# Patient Record
Sex: FEMALE | Race: WHITE | Employment: PART TIME | URBAN - METROPOLITAN AREA
[De-identification: names, ages, dates, MRNs, and addresses within clinical notes are randomized per-mention and may not be internally consistent; named-entity substitution may affect disease eponyms.]

---

## 2018-09-28 ENCOUNTER — APPOINTMENT (OUTPATIENT)
Dept: ULTRASOUND IMAGING | Age: 23
End: 2018-09-28
Payer: MEDICAID

## 2018-09-28 ENCOUNTER — HOSPITAL ENCOUNTER (EMERGENCY)
Age: 23
Discharge: LEFT BEFORE DISCHARGE | End: 2018-09-28
Payer: MEDICAID

## 2018-09-28 VITALS
SYSTOLIC BLOOD PRESSURE: 111 MMHG | DIASTOLIC BLOOD PRESSURE: 66 MMHG | OXYGEN SATURATION: 100 % | WEIGHT: 100 LBS | HEART RATE: 82 BPM | HEIGHT: 61 IN | TEMPERATURE: 98.1 F | BODY MASS INDEX: 18.88 KG/M2 | RESPIRATION RATE: 18 BRPM

## 2018-09-28 DIAGNOSIS — Z53.20 PATIENT LEFT BEFORE TREATMENT COMPLETED: ICD-10-CM

## 2018-09-28 DIAGNOSIS — R10.10 UPPER ABDOMINAL PAIN: Primary | ICD-10-CM

## 2018-09-28 LAB
A/G RATIO: 1.6 (ref 1.1–2.2)
ALBUMIN SERPL-MCNC: 4.6 G/DL (ref 3.4–5)
ALP BLD-CCNC: 101 U/L (ref 40–129)
ALT SERPL-CCNC: 25 U/L (ref 10–40)
ANION GAP SERPL CALCULATED.3IONS-SCNC: 13 MMOL/L (ref 3–16)
AST SERPL-CCNC: 17 U/L (ref 15–37)
BASOPHILS ABSOLUTE: 0.1 K/UL (ref 0–0.2)
BASOPHILS RELATIVE PERCENT: 1.1 %
BILIRUB SERPL-MCNC: 0.4 MG/DL (ref 0–1)
BILIRUBIN URINE: NEGATIVE
BLOOD, URINE: NEGATIVE
BUN BLDV-MCNC: 14 MG/DL (ref 7–20)
CALCIUM SERPL-MCNC: 9.7 MG/DL (ref 8.3–10.6)
CHLORIDE BLD-SCNC: 102 MMOL/L (ref 99–110)
CLARITY: CLEAR
CO2: 28 MMOL/L (ref 21–32)
COLOR: YELLOW
CREAT SERPL-MCNC: <0.5 MG/DL (ref 0.6–1.1)
EOSINOPHILS ABSOLUTE: 0.1 K/UL (ref 0–0.6)
EOSINOPHILS RELATIVE PERCENT: 1.3 %
GFR AFRICAN AMERICAN: >60
GFR NON-AFRICAN AMERICAN: >60
GLOBULIN: 2.8 G/DL
GLUCOSE BLD-MCNC: 102 MG/DL (ref 70–99)
GLUCOSE URINE: NEGATIVE MG/DL
HCG(URINE) PREGNANCY TEST: NEGATIVE
HCT VFR BLD CALC: 43.3 % (ref 36–48)
HEMOGLOBIN: 15.2 G/DL (ref 12–16)
KETONES, URINE: NEGATIVE MG/DL
LEUKOCYTE ESTERASE, URINE: NEGATIVE
LIPASE: 53 U/L (ref 13–60)
LYMPHOCYTES ABSOLUTE: 2.1 K/UL (ref 1–5.1)
LYMPHOCYTES RELATIVE PERCENT: 25.6 %
MCH RBC QN AUTO: 30.6 PG (ref 26–34)
MCHC RBC AUTO-ENTMCNC: 35 G/DL (ref 31–36)
MCV RBC AUTO: 87.5 FL (ref 80–100)
MICROSCOPIC EXAMINATION: NORMAL
MONOCYTES ABSOLUTE: 0.5 K/UL (ref 0–1.3)
MONOCYTES RELATIVE PERCENT: 6.4 %
NEUTROPHILS ABSOLUTE: 5.4 K/UL (ref 1.7–7.7)
NEUTROPHILS RELATIVE PERCENT: 65.6 %
NITRITE, URINE: NEGATIVE
PDW BLD-RTO: 12.9 % (ref 12.4–15.4)
PH UA: 8
PLATELET # BLD: 281 K/UL (ref 135–450)
PMV BLD AUTO: 8.4 FL (ref 5–10.5)
POTASSIUM REFLEX MAGNESIUM: 4.3 MMOL/L (ref 3.5–5.1)
PROTEIN UA: NEGATIVE MG/DL
RBC # BLD: 4.95 M/UL (ref 4–5.2)
SODIUM BLD-SCNC: 143 MMOL/L (ref 136–145)
SPECIFIC GRAVITY UA: 1.01
TOTAL PROTEIN: 7.4 G/DL (ref 6.4–8.2)
URINE REFLEX TO CULTURE: NORMAL
URINE TYPE: NORMAL
UROBILINOGEN, URINE: 0.2 E.U./DL
WBC # BLD: 8.2 K/UL (ref 4–11)

## 2018-09-28 PROCEDURE — 2580000003 HC RX 258: Performed by: PHYSICIAN ASSISTANT

## 2018-09-28 PROCEDURE — 81003 URINALYSIS AUTO W/O SCOPE: CPT

## 2018-09-28 PROCEDURE — 76705 ECHO EXAM OF ABDOMEN: CPT

## 2018-09-28 PROCEDURE — 85025 COMPLETE CBC W/AUTO DIFF WBC: CPT

## 2018-09-28 PROCEDURE — 84703 CHORIONIC GONADOTROPIN ASSAY: CPT

## 2018-09-28 PROCEDURE — 83690 ASSAY OF LIPASE: CPT

## 2018-09-28 PROCEDURE — 99285 EMERGENCY DEPT VISIT HI MDM: CPT

## 2018-09-28 PROCEDURE — 80053 COMPREHEN METABOLIC PANEL: CPT

## 2018-09-28 PROCEDURE — 6360000002 HC RX W HCPCS: Performed by: PHYSICIAN ASSISTANT

## 2018-09-28 RX ORDER — 0.9 % SODIUM CHLORIDE 0.9 %
1000 INTRAVENOUS SOLUTION INTRAVENOUS ONCE
Status: COMPLETED | OUTPATIENT
Start: 2018-09-28 | End: 2018-09-28

## 2018-09-28 RX ORDER — KETOROLAC TROMETHAMINE 30 MG/ML
30 INJECTION, SOLUTION INTRAMUSCULAR; INTRAVENOUS ONCE
Status: COMPLETED | OUTPATIENT
Start: 2018-09-28 | End: 2018-09-28

## 2018-09-28 RX ADMIN — SODIUM CHLORIDE 1000 ML: 9 INJECTION, SOLUTION INTRAVENOUS at 14:42

## 2018-09-28 RX ADMIN — KETOROLAC TROMETHAMINE 30 MG: 30 INJECTION, SOLUTION INTRAMUSCULAR at 14:42

## 2018-09-28 ASSESSMENT — PAIN DESCRIPTION - DESCRIPTORS: DESCRIPTORS: STABBING

## 2018-09-28 ASSESSMENT — PAIN SCALES - GENERAL: PAINLEVEL_OUTOF10: 7

## 2018-09-28 ASSESSMENT — ENCOUNTER SYMPTOMS
SHORTNESS OF BREATH: 0
DIARRHEA: 0
VOMITING: 0
ABDOMINAL PAIN: 1

## 2018-09-28 ASSESSMENT — PAIN DESCRIPTION - PAIN TYPE: TYPE: ACUTE PAIN

## 2018-09-28 ASSESSMENT — PAIN DESCRIPTION - LOCATION: LOCATION: ABDOMEN

## 2018-09-28 NOTE — ED PROVIDER NOTES
(1.549 m)   Wt 100 lb (45.4 kg)   LMP 09/07/2018   SpO2 100%   BMI 18.89 kg/m²     Physical Exam   Constitutional: She is oriented to person, place, and time. She appears well-developed and well-nourished. HENT:   Head: Normocephalic and atraumatic. Mouth/Throat: Oropharynx is clear and moist and mucous membranes are normal. No oropharyngeal exudate, posterior oropharyngeal edema or posterior oropharyngeal erythema. Eyes: Pupils are equal, round, and reactive to light. Conjunctivae are normal.   Neck: Normal range of motion. Neck supple. No JVD present. Cardiovascular: Normal rate, regular rhythm and intact distal pulses. Pulmonary/Chest: Effort normal and breath sounds normal. No respiratory distress. She has no wheezes. She has no rales. Abdominal: Soft. Bowel sounds are normal. She exhibits no distension. There is tenderness in the left upper quadrant. There is no rigidity, no rebound, no guarding and no CVA tenderness. Musculoskeletal: Normal range of motion. She exhibits no edema. Neurological: She is alert and oriented to person, place, and time. She has normal strength. No cranial nerve deficit or sensory deficit. She exhibits normal muscle tone. Coordination normal. GCS eye subscore is 4. GCS verbal subscore is 5. GCS motor subscore is 6. Skin: Skin is warm and dry. No rash noted. Psychiatric: She has a normal mood and affect. Her behavior is normal.   Vitals reviewed.       Procedures    MDM  Number of Diagnoses or Management Options     Amount and/or Complexity of Data Reviewed  Clinical lab tests: ordered and reviewed  Tests in the radiology section of CPT®: ordered and reviewed  Review and summarize past medical records: yes    Patient Progress  Patient progress: stable         Results for orders placed or performed during the hospital encounter of 09/28/18   CBC Auto Differential   Result Value Ref Range    WBC 8.2 4.0 - 11.0 K/uL    RBC 4.95 4.00 - 5.20 M/uL    Hemoglobin 15.2

## 2018-09-28 NOTE — ED NOTES
Pt. Is alert and oriented, aware of plan for ultrasound and observation at this time. Pt. Medicated per mar and denies further needs at this time. piv to R ac appears wnl.      Bull Chen RN  09/28/18 [id number]

## 2019-04-10 ENCOUNTER — HOSPITAL ENCOUNTER (EMERGENCY)
Age: 24
Discharge: HOME | End: 2019-04-10
Attending: EMERGENCY MEDICINE
Payer: MEDICAID

## 2019-04-10 ENCOUNTER — APPOINTMENT (OUTPATIENT)
Dept: GENERAL RADIOLOGY | Age: 24
End: 2019-04-10
Payer: MEDICAID

## 2019-04-10 VITALS
WEIGHT: 110 LBS | DIASTOLIC BLOOD PRESSURE: 100 MMHG | OXYGEN SATURATION: 100 % | HEART RATE: 104 BPM | RESPIRATION RATE: 18 BRPM | SYSTOLIC BLOOD PRESSURE: 132 MMHG | TEMPERATURE: 97.9 F | BODY MASS INDEX: 20.78 KG/M2

## 2019-04-10 DIAGNOSIS — R21 SKIN RASH: Primary | ICD-10-CM

## 2019-04-10 DIAGNOSIS — M25.511 ACUTE PAIN OF RIGHT SHOULDER: ICD-10-CM

## 2019-04-10 PROCEDURE — 6370000000 HC RX 637 (ALT 250 FOR IP): Performed by: EMERGENCY MEDICINE

## 2019-04-10 PROCEDURE — 99283 EMERGENCY DEPT VISIT LOW MDM: CPT

## 2019-04-10 PROCEDURE — 73030 X-RAY EXAM OF SHOULDER: CPT

## 2019-04-10 RX ORDER — IBUPROFEN 600 MG/1
600 TABLET ORAL ONCE
Status: COMPLETED | OUTPATIENT
Start: 2019-04-10 | End: 2019-04-10

## 2019-04-10 RX ORDER — IBUPROFEN 600 MG/1
600 TABLET ORAL EVERY 6 HOURS PRN
Qty: 20 TABLET | Refills: 0 | Status: SHIPPED | OUTPATIENT
Start: 2019-04-10 | End: 2019-10-08

## 2019-04-10 RX ADMIN — IBUPROFEN 600 MG: 600 TABLET, FILM COATED ORAL at 15:38

## 2019-04-10 ASSESSMENT — PAIN SCALES - GENERAL
PAINLEVEL_OUTOF10: 3
PAINLEVEL_OUTOF10: 5
PAINLEVEL_OUTOF10: 3

## 2019-04-10 ASSESSMENT — PAIN - FUNCTIONAL ASSESSMENT: PAIN_FUNCTIONAL_ASSESSMENT: 0-10

## 2019-04-10 NOTE — ED PROVIDER NOTES
[facility] Emergency Department    CHIEF COMPLAINT  Shoulder Pain (woke up with right shoulder pain, no injury)      HISTORY OF PRESENT ILLNESS  Carolee Banks is a 26 y.o. female  presenting to the ER with primary complaint of right shoulder pain. Patient says right shoulder pain woke her from sleep this morning. She described it as an aching pain. Pain is worse with movement. She denies any definite injuries or trauma. Also, patient is concerned about a rash on her left lower leg which has been there for the past several weeks. No other complaints, modifying factors or associated symptoms. I have reviewed the following from the nursing documentation. History reviewed. No pertinent past medical history. History reviewed. No pertinent surgical history.   Family History   Problem Relation Age of Onset    Arthritis Mother     Arthritis Maternal Grandfather     Atrial Fibrillation Maternal Grandfather     Diabetes Paternal Grandfather      Social History     Socioeconomic History    Marital status: Single     Spouse name: Not on file    Number of children: Not on file    Years of education: Not on file    Highest education level: Not on file   Occupational History    Not on file   Social Needs    Financial resource strain: Not on file    Food insecurity:     Worry: Not on file     Inability: Not on file    Transportation needs:     Medical: Not on file     Non-medical: Not on file   Tobacco Use    Smoking status: Current Every Day Smoker     Packs/day: 1.50     Types: Cigarettes    Smokeless tobacco: Never Used   Substance and Sexual Activity    Alcohol use: No    Drug use: No     Comment: last used heroin on 9-21-18    Sexual activity: Yes     Partners: Male   Lifestyle    Physical activity:     Days per week: Not on file     Minutes per session: Not on file    Stress: Not on file   Relationships    Social connections:     Talks on phone: Not on file     Gets together: Not on file     Attends Restoration service: Not on file     Active member of club or organization: Not on file     Attends meetings of clubs or organizations: Not on file     Relationship status: Not on file    Intimate partner violence:     Fear of current or ex partner: Not on file     Emotionally abused: Not on file     Physically abused: Not on file     Forced sexual activity: Not on file   Other Topics Concern    Not on file   Social History Narrative    Not on file     No current facility-administered medications for this encounter. Current Outpatient Medications   Medication Sig Dispense Refill    ibuprofen (ADVIL;MOTRIN) 600 MG tablet Take 1 tablet by mouth every 6 hours as needed for Pain 20 tablet 0     Allergies   Allergen Reactions    Ciprofloxacin Rash       REVIEW OF SYSTEMS  10 systems reviewed, pertinent positives per HPI otherwise noted to be negative. PHYSICAL EXAM  BP (!) 132/100 Comment: spoke with MD De Leon regarding BP; no concern at this time  Pulse 104   Temp 97.9 °F (36.6 °C) (Oral)   Resp 18   Wt 110 lb (49.9 kg)   SpO2 100%   BMI 20.78 kg/m²   GENERAL APPEARANCE: Awake and alert. Cooperative. No acute distress. HEAD: Normocephalic. Atraumatic. EYES: PERRL. EOM's grossly intact. ENT: Mucous membranes are moist.   NECK: Supple. Non-tender  HEART: RRR. LUNGS: Respirations unlabored. CTAB. Good air exchange. Speaking comfortably in full sentences. ABDOMEN: Soft. Non-distended. Non-tender. No masses. No organomegaly. No guarding or rebound. BACK:  No midline Tenderness. EXTREMITIES: No peripheral edema. Moves all extremities equally. All extremities neurovascularly intact. Mild tenderness to palpation anterior lateral aspect of the right shoulder. No deformity. Strong distal pulses  SKIN: Mild scattered erythematous macular areas left lower extremity  NEUROLOGICAL: Alert and oriented. CN's 2-12 intact. No gross facial drooping.  Strength 5/5, sensation intact. 2 plus DTR's in lower extremity bilaterally. Gait normal.   PSYCHIATRIC: Normal mood and affect. LABS  I have reviewed all labs for this visit. No results found for this visit on 04/10/19. RADIOLOGY    Xr Shoulder Right (min 2 Views)    Result Date: 4/10/2019  EXAMINATION: 3 XRAY VIEWS OF THE RIGHT SHOULDER 4/10/2019 3:26 pm COMPARISON: None. HISTORY: ORDERING SYSTEM PROVIDED HISTORY: pain TECHNOLOGIST PROVIDED HISTORY: Reason for exam:->pain Ordering Physician Provided Reason for Exam: shoulder pain for 1 day; woke up with pain, no known injury Acuity: Acute Type of Exam: Initial FINDINGS: The alignment of the right shoulder is normal.  There is no acute fracture or dislocation. There is no significant arthropathy appreciated. Soft tissues are unremarkable. Normal views of the right shoulder. ED COURSE/MDM  Patient seen and evaluated. Old records reviewed. Imaging reviewed and results discussed with patient. Patient denies possibility of being pregnant. Patient had x-rays of the right shoulder which were normal.  I did recommend to the patient that she follow-up as an outpatient with orthopedics if pain persists with consideration of MRI. She was referred to dermatology for ongoing management of rash. Patient's heart rate is slightly elevated and blood pressure slightly elevated upon discharge the patient says she feels anxious about being in the hospital.  There does not appear to be any acute intervention necessary at this time for her vital signs. CLINICAL IMPRESSION  1. Skin rash    2. Acute pain of right shoulder        Blood pressure (!) 132/100, pulse 104, temperature 97.9 °F (36.6 °C), temperature source Oral, resp. rate 18, weight 110 lb (49.9 kg), SpO2 100 %, unknown if currently breastfeeding. Kayla Torres was discharged to home in stable condition.          Nick Hamilton DO  04/10/19 1640

## 2019-10-08 ENCOUNTER — HOSPITAL ENCOUNTER (EMERGENCY)
Age: 24
Discharge: HOME | End: 2019-10-08
Attending: EMERGENCY MEDICINE
Payer: MEDICAID

## 2019-10-08 VITALS
HEIGHT: 61 IN | TEMPERATURE: 97.9 F | HEART RATE: 86 BPM | RESPIRATION RATE: 20 BRPM | OXYGEN SATURATION: 97 % | SYSTOLIC BLOOD PRESSURE: 113 MMHG | WEIGHT: 100 LBS | BODY MASS INDEX: 18.88 KG/M2 | DIASTOLIC BLOOD PRESSURE: 68 MMHG

## 2019-10-08 DIAGNOSIS — T40.1X1A ACCIDENTAL OVERDOSE OF HEROIN, INITIAL ENCOUNTER (HCC): Primary | ICD-10-CM

## 2019-10-08 PROCEDURE — 99284 EMERGENCY DEPT VISIT MOD MDM: CPT

## 2019-10-08 ASSESSMENT — PAIN DESCRIPTION - LOCATION: LOCATION: ABDOMEN

## 2019-10-08 ASSESSMENT — PAIN SCALES - GENERAL: PAINLEVEL_OUTOF10: 10

## 2020-04-19 ENCOUNTER — HOSPITAL ENCOUNTER (EMERGENCY)
Age: 25
Discharge: HOME | End: 2020-04-19
Attending: EMERGENCY MEDICINE
Payer: COMMERCIAL

## 2020-04-19 VITALS
OXYGEN SATURATION: 98 % | HEART RATE: 99 BPM | RESPIRATION RATE: 16 BRPM | BODY MASS INDEX: 26.43 KG/M2 | HEIGHT: 61 IN | TEMPERATURE: 98.5 F | WEIGHT: 140 LBS

## 2020-04-19 LAB — S PYO AG THROAT QL: NEGATIVE

## 2020-04-19 PROCEDURE — 6360000002 HC RX W HCPCS: Performed by: EMERGENCY MEDICINE

## 2020-04-19 PROCEDURE — 87880 STREP A ASSAY W/OPTIC: CPT

## 2020-04-19 PROCEDURE — 99282 EMERGENCY DEPT VISIT SF MDM: CPT

## 2020-04-19 PROCEDURE — 87081 CULTURE SCREEN ONLY: CPT

## 2020-04-19 PROCEDURE — 6370000000 HC RX 637 (ALT 250 FOR IP): Performed by: EMERGENCY MEDICINE

## 2020-04-19 RX ORDER — AMOXICILLIN 500 MG/1
500 CAPSULE ORAL ONCE
Status: COMPLETED | OUTPATIENT
Start: 2020-04-19 | End: 2020-04-19

## 2020-04-19 RX ORDER — DEXAMETHASONE 4 MG/1
8 TABLET ORAL ONCE
Status: COMPLETED | OUTPATIENT
Start: 2020-04-19 | End: 2020-04-19

## 2020-04-19 RX ORDER — ESCITALOPRAM OXALATE 10 MG/1
TABLET ORAL
COMMUNITY
Start: 2020-03-05 | End: 2022-07-12

## 2020-04-19 RX ORDER — AMOXICILLIN 500 MG/1
500 CAPSULE ORAL 3 TIMES DAILY
Qty: 30 CAPSULE | Refills: 0 | Status: SHIPPED | OUTPATIENT
Start: 2020-04-19 | End: 2020-04-29

## 2020-04-19 RX ADMIN — DEXAMETHASONE 8 MG: 4 TABLET ORAL at 22:00

## 2020-04-19 RX ADMIN — AMOXICILLIN 500 MG: 500 CAPSULE ORAL at 21:59

## 2020-04-19 ASSESSMENT — ENCOUNTER SYMPTOMS
SORE THROAT: 1
VOICE CHANGE: 1
STRIDOR: 0
ABDOMINAL PAIN: 0
VOMITING: 0
COLOR CHANGE: 0
TROUBLE SWALLOWING: 0
WHEEZING: 0
FACIAL SWELLING: 0
SHORTNESS OF BREATH: 0
NAUSEA: 0

## 2020-04-19 ASSESSMENT — PAIN DESCRIPTION - FREQUENCY: FREQUENCY: CONTINUOUS

## 2020-04-19 ASSESSMENT — PAIN DESCRIPTION - LOCATION: LOCATION: THROAT

## 2020-04-19 ASSESSMENT — PAIN DESCRIPTION - DESCRIPTORS: DESCRIPTORS: CONSTANT

## 2020-04-19 ASSESSMENT — PAIN DESCRIPTION - PAIN TYPE: TYPE: ACUTE PAIN

## 2020-04-20 ENCOUNTER — CARE COORDINATION (OUTPATIENT)
Dept: CARE COORDINATION | Age: 25
End: 2020-04-20

## 2020-04-20 NOTE — ED PROVIDER NOTES
pain, trouble swallowing or breathing, neck stiffness, fever) that necessitate immediate return. Procedures    FINAL IMPRESSION      1. Pain, dental    2. Sore throat          DISPOSITION/PLAN   DISPOSITION Decision To Discharge 04/19/2020 09:47:45 PM      PATIENT REFERRED TO:  Your dentist    In 3 days      Antoine Aly MD  [address] [place], [place] [id number] [phone]    In 1 week      [facility] ED  [address] [place] [address]  [phone]    If symptoms worsen      DISCHARGE MEDICATIONS:  New Prescriptions    AMOXICILLIN (AMOXIL) 500 MG CAPSULE    Take 1 capsule by mouth 3 times daily for 10 days          (Please note that portions of this note were completed with a voice recognition program.  Efforts were made to edit the dictations but occasionally words aremis-transcribed. )    Princess Reyez MD (electronically signed)  Attending Emergency Physician           Princess Reyez MD  04/19/20 [id number]

## 2020-04-21 ENCOUNTER — CARE COORDINATION (OUTPATIENT)
Dept: CARE COORDINATION | Age: 25
End: 2020-04-21

## 2020-04-21 LAB — S PYO THROAT QL CULT: NORMAL

## 2020-04-27 ENCOUNTER — CARE COORDINATION (OUTPATIENT)
Dept: CARE COORDINATION | Age: 25
End: 2020-04-27

## 2020-05-04 ENCOUNTER — CARE COORDINATION (OUTPATIENT)
Dept: CARE COORDINATION | Age: 25
End: 2020-05-04

## 2020-05-04 NOTE — CARE COORDINATION
Spoke with Dad and she's not home right now. Attempted to call. No answer on her cell and it kept ringing.

## 2022-04-04 ENCOUNTER — TELEPHONE (OUTPATIENT)
Dept: WOMENS IMAGING | Age: 27
End: 2022-04-04

## 2022-04-04 ENCOUNTER — HOSPITAL ENCOUNTER (OUTPATIENT)
Dept: WOMENS IMAGING | Age: 27
Discharge: HOME | End: 2022-04-04
Payer: COMMERCIAL

## 2022-04-04 DIAGNOSIS — N63.10 MASS OF RIGHT BREAST: ICD-10-CM

## 2022-04-04 PROCEDURE — 76642 ULTRASOUND BREAST LIMITED: CPT

## 2022-04-04 NOTE — TELEPHONE ENCOUNTER
[address]   [address] [place] [address], [address] [place] [address]  Agueda Hernandez 51   Phone: [phone]         ULTRASOUND BIOPSY EDUCATION    NAME:  Tanner Grande OF BIRTH:  1995   MEDICAL RECORD NUMBER:  [id number]   TODAY'S DATE:  4/4/2022    Referring Physician: Dr. Josselyn Qureshi    Procedure: U/S Core Bx    Right breast    Date of biopsy: 4/12/22    Patient taking blood thinners: no    Medicine allergies: yes - Cipro    Special Instructions: n/a    Biopsy order form faxed to referring MD.          What is an Ultrasound Guided Breast Biopsy? Ultrasound guided breast biopsy is a test that uses ultrasound to find an area of your breast where a tissue sample will be taken. The sample is then looked at under a microscope to check for signs of breast cancer. Why is it done? An Ultrasound biopsy is usually done to check for cancer in a lump or cyst found during a mammogram or ultrasound. Preparing for the test?     * Take your medications as prescribed    You may eat and drink fluids before the test    Take a shower the evening or morning before the biopsy. What happens before the test?  Images are taken to find the exact site to be biopsied.   Your skin is washed with an alcohol prep.   You will be given an injection of medication to numb your breast.   What happens during the test?     Once your breast is numb, a small cut (incision) is made.   Using the imaging, the doctor will guide the needle into the biopsy area.   A sample of breast tissue is taken through the needle.   A small \"Clip\" or Marker is inserted into your breast to jessica the biopsy site.   The needle is removed and pressure put on the needle site to stop any bleeding.   A bandage will be placed over the site.   A post mammogram picture will be taken to document the clip placement. How long does the test take? Approximately 60 minutes.   Most of the time is spent finding the area for the biopsy. What are the risks?   Bleeding: You may have some bleeding which can cause bruising, swelling,    or a bleeding under your skin.   Infection:  Signs of infection are redness, swelling, heat, or increasing pain    at the biopsy Site.   Sample size not adequate: This would require repeating the biopsy. What happens after the test?    The nurse will review your post biopsy instructions which include:         Placing an ice pack in your bra.    Wearing a firm fitting bra.    You may use Tylenol (Acetaminiphen) for discomfort. Lab results take about 2-3 business days. The Nurse Navigator or your doctor will call you with the results. Ultrasound Breast Biopsy:     (Please arrive 15 minutes early)                                                 [x] Blood thinner history reviewed with patient    [x] Take all your other medications on your normal routine schedule. [x] You may eat and drink as normal before your biopsy. [x] You may drive yourself. [x] No heavy lifting or exercise for 48 hours after the biopsy. [x] Printed Pre Ultrasound Biopsy Instructions were provided, reviewed with the patient and all questions were answered. The Breast Center's Information:   Should you experience any significant changes in your health or have questions about your care, please contact:  Angy Arciniega, Breast Nurse Navigator at [facility], [facility]:  [phone]  Monday-Friday. If you need help with your care outside these hours and cannot wait until we are again available, contact your Physician or go to the hospital emergency room. [x] Patient/POA/Caregiver verbalized understanding of instructions.        Electronically signed by Angy Arciniega RN on 4/4/2022 at 4:04 PM

## 2022-04-12 ENCOUNTER — HOSPITAL ENCOUNTER (OUTPATIENT)
Dept: WOMENS IMAGING | Age: 27
End: 2022-04-12
Payer: COMMERCIAL

## 2022-04-12 ENCOUNTER — HOSPITAL ENCOUNTER (OUTPATIENT)
Dept: WOMENS IMAGING | Age: 27
Discharge: HOME | End: 2022-04-12
Payer: COMMERCIAL

## 2022-04-12 DIAGNOSIS — R92.8 ABNORMAL ULTRASOUND OF BREAST: ICD-10-CM

## 2022-04-12 PROCEDURE — 88305 TISSUE EXAM BY PATHOLOGIST: CPT

## 2022-04-12 PROCEDURE — 19083 BX BREAST 1ST LESION US IMAG: CPT

## 2022-04-12 NOTE — PROGRESS NOTES
Patient in [address] [place] [address] for breast biopsy. Radiologist reviewed procedure with patient, consent signed. Patient tolerated procedure well. Compression held. Site cleansed with chloraprep, steri strips and dry dressing applied. Ice pack provided. Reviewed discharge instructions with patient. Patient verbalized understanding and agreed to contact the Breast Navigator with any questions. Patient was A&Ox3 and steady on feet and discharged to waiting area. The [address]   Discharge Instructions  [facility]  [address]  [address]  Telephone: [phone]    NAME:  Salomon Juarez:  1995  GENDER: female  MEDICAL RECORD NUMBER:  [id number]  TODAY'S DATE:  2022    Discharge Instructions Breast Center:    Post Breast Biopsy Instructions     [x] You may remove your outer dressing in 24 hours and you may shower. \"Steri-strips\" tape will stay on for 5-7 days. [x] Place a cold pack inside your bra on top of the dressing for at least 3 hours, removing it every 15 minutes for 15 minutes after your biopsy. [x] Wear a firm fitting bra for at least 24 hours after your biopsy, including while you sleep. [x] Place an ice pack inside your bra on top of the dressing for at least 3 hours, removing it every 15 minutes for 15 minutes after your biopsy. [x] You may resume your held medications tomorrow, unless otherwise directed by your physician. [x] Your physician has instructed you to take Tylenol (Acetaminophen) the day of your biopsy for any discomfort. [x] Watch for excessive bleeding. If bleeding occurs apply pressure to site. Watch for signs of infection, increased pain, redness, swelling and heat. If this occurs call your physician. [x] Do not participate in any strenuous exercise for 48 hours after your biopsy and do not lift, pull or push anything over 5-10 lbs.       [x] Results will be available in 2-3 working days. Your referring physician or the Nurse Navigator will call you with results. The Breast Center Information:   Should you experience any significant changes in your health or have questions about your care, please contact:  Robby Underwood Nurse Breast Navigator with [facility]  [phone]  Monday-Friday. If you need help with your care outside these hours and cannot wait until we are again available, contact your Physician or go to the hospital emergency room.         Electronically signed by Robby Underwood RN on 4/12/2022 at 11:23 AM

## 2022-04-13 ENCOUNTER — TELEPHONE (OUTPATIENT)
Dept: WOMENS IMAGING | Age: 27
End: 2022-04-13

## 2022-06-21 ENCOUNTER — HOSPITAL ENCOUNTER (OUTPATIENT)
Dept: ULTRASOUND IMAGING | Age: 27
Discharge: HOME | End: 2022-06-21
Payer: COMMERCIAL

## 2022-06-21 DIAGNOSIS — O36.80X0 ENCOUNTER TO DETERMINE FETAL VIABILITY OF PREGNANCY, SINGLE OR UNSPECIFIED FETUS: ICD-10-CM

## 2022-06-21 PROCEDURE — 76801 OB US < 14 WKS SINGLE FETUS: CPT

## 2022-07-11 ENCOUNTER — HOSPITAL ENCOUNTER (OUTPATIENT)
Dept: ULTRASOUND IMAGING | Age: 27
Discharge: HOME | End: 2022-07-11
Payer: COMMERCIAL

## 2022-07-11 DIAGNOSIS — O02.1 MISSED ABORTION: ICD-10-CM

## 2022-07-11 PROCEDURE — 76817 TRANSVAGINAL US OBSTETRIC: CPT

## 2022-07-11 PROCEDURE — 76801 OB US < 14 WKS SINGLE FETUS: CPT

## 2022-07-12 ENCOUNTER — PREP FOR PROCEDURE (OUTPATIENT)
Dept: OBGYN | Age: 27
End: 2022-07-12

## 2022-07-12 DIAGNOSIS — O02.1 MISSED ABORTION WITH FETAL DEMISE BEFORE 20 COMPLETED WEEKS OF GESTATION: ICD-10-CM

## 2022-07-12 RX ORDER — SODIUM CHLORIDE 9 MG/ML
INJECTION, SOLUTION INTRAVENOUS PRN
Status: CANCELLED | OUTPATIENT
Start: 2022-07-12

## 2022-07-12 RX ORDER — SODIUM CHLORIDE 0.9 % (FLUSH) 0.9 %
5-40 SYRINGE (ML) INJECTION PRN
Status: CANCELLED | OUTPATIENT
Start: 2022-07-12

## 2022-07-12 RX ORDER — DOXYCYCLINE HYCLATE 100 MG
200 TABLET ORAL ONCE
Status: CANCELLED | OUTPATIENT
Start: 2022-07-12 | End: 2022-07-12

## 2022-07-12 RX ORDER — SODIUM CHLORIDE 0.9 % (FLUSH) 0.9 %
5-40 SYRINGE (ML) INJECTION EVERY 12 HOURS SCHEDULED
Status: CANCELLED | OUTPATIENT
Start: 2022-07-12

## 2022-07-12 NOTE — H&P
>    Patient: Michelle Salter    YOB: 1995   Birth Sex: Female   Date: 2022 01:00 PM   Visit Type: Office Visit - GYN   This 33year old patient presents for Follow up ultrasound post MAB. History of Present Illness:  1. Follow up ultrasound post MAB   26yo  with known fetal demise requests suction D&C for definitive management. Nonviable pregnancy diagnosed at [address], patient had follow up ultrasound [id number] for confirmation/reaffirmation. That ultrasound confirmed no interval growth and no fetal cardiac activity. Of note, there is a left ovarian cyst measuring 4.3cm (5cm on previous ultrasound). Patient reports no bleeding, no pelvic pain, no fever. Still has mild pregnancy symptoms. Screening Tools  Other Screenings:  Encounter Date Performed Date Instrument Score Severity/Interpretation MDD Classification   2022 CAGE-AID Alcohol and Drug Screening 0 None    2022 Patient Health Questionnaire (PHQ-2) 0 Further testing is not required    2022 SDOH 0 Intervention not needed    2022 SDOH 0 Intervention not needed      CAGE ALCOHOL SCREENING TEST      Felt need to cut down drinking? No       Ever felt annoyed by criticism of drinking? No       Had guilty feelings about drinking? No       Ever take morning eye-opener? No       Performed Date:       Score: 0          Gynecologic History  Patient is premenopausal.   Menarche:  16 y.o. (onset)  No hormone replacement therapy. 2022 01:00 PM  Date of last mammogram: 2022. Obstetric History  :2.      Parity: Term:1.  Pre-Term: 0. : 1. Livin. Full term: 1. C-sections: 1. Livin. Spontaneous abortions: 1.     Pregnancy # Birth order Date Delivery Type GA(wks) Labor(hrs) Weight Sex   1  [id number]  low transverse 36w0d  5 lb(s) 6 oz Female   2    8w0d      Past Systemic History    Medical History  (Detailed)  Disease Onset Date Comments   Csection 07/11/2016    Preeclampsia 2016    anxiety     h/o drug use         Surgical History/Management  (Detailed)    Diagnostics History  Status Study Ordered Completed Interpretation  Result / Report   ordered Diag Mamography;incl CAD; Bilateral 03/30/2022       completed Ultrasound,Breast, Single Right 03/31/2022 04/13/2022      completed Ultrasound,Breast,bilateral 03/30/2022 04/13/2022      ordered ULTRASOUND/PREG BRIEF 06/16/2022       ordered ULTRASOUND/PREG TRANSVAG. 07/08/2022           Medications (active prior to today)  Medication Name Sig Description Start Date Stop Date Refilled Rx Elsewhere   Medical Marijuana  INHALATION  //   Y   pyridoxine (vitamin B6) 25 mg tablet 1/2 tablet po in am; 1/2 tablet po in pm as needed for nausea 06/17/2022   N   Unisom (doxylamine) 25 mg tablet 1/2 tablet po in am; 1/2 tablet po in pm as needed for nausea 06/17/2022   N   Cytotec 200 mcg tablet take 3 tablets by oral route at once, then repeat dose in 24h 06/23/2022   N   ibuprofen 800 mg tablet take 1 tablet by oral route 3 times every day with food 06/23/2022   N   Prenatal Gummies 400 mcg-35 mg-25 mg-5 mg chewable tablet 2 PO QD 07/08/2022   N       Medication Reconciliation  Medications reconciled today.     Medication Reviewed  Adherence Medication Name Sig Desc Elsewhere Status   not taking Cytotec 200 mcg tablet take 3 tablets by oral route at once, then repeat dose in 24h N Verified   taking as directed ibuprofen 800 mg tablet take 1 tablet by oral route 3 times every day with food N Verified   not taking pyridoxine (vitamin B6) 25 mg tablet 1/2 tablet po in am; 1/2 tablet po in pm as needed for nausea N Verified   not taking Unisom (doxylamine) 25 mg tablet 1/2 tablet po in am; 1/2 tablet po in pm as needed for nausea N Verified   not taking Prenatal Gummies 400 mcg-35 mg-25 mg-5 mg chewable tablet 2 PO QD N Verified   taking as directed Medical Marijuana INHALATION  Y Verified     Allergies  Ingredient Reaction (Severity) Medication Name Comment   NO KNOWN ALLERGIES            Family History    (Detailed)  Relationship Family Member Name  Age at Death Condition Onset Age Cause of Death   Father    malignant neoplasm of breast in first degree relative  N   Father  N  Alive and well     Mother  N  Alive and well       Social History:  (Detailed)  Tobacco use reviewed. Preferred language is [place]. Born in [place]. EDUCATION/EMPLOYMENT/OCCUPATION  The patient has a(n) high school education. Employment History Status Retired Restrictions   [employer]  Employed full-time       MARITAL STATUS/FAMILY/SOCIAL SUPPORT  Marital status: Single   Tobacco use status: Ex-cigarette smoker. Smoking status: Former smoker. SMOKING STATUS  Type Smoking Status Usage Per Day Years Used Pack Years Total Pack Years   Cigarette Former smoker 0.5 Packs 9.00 4.50 4.50     VAPING USE  Screened for vaping? Yes  Status: Current user  Device type: Advanced Personal Vaporizers (APVs)  Frequency: Occasionally  Current strength: 0.5% (5 mg/mL)  Vaping cessation discussed: Yes    ALCOHOL  There is no history of alcohol use. CAFFEINE  The patient uses caffeine: soda and tea. - 16 oz a day. LIFESTYLE  PETS  - cats. Latter day/SPIRITUAL  Patient agrees to transfusion. HOME ENVIRONMENT/SAFETY  Uses seat belts. Review of Systems  Review of Systems  System Neg/Pos Details   Constitutional Negative See HPI. Reproductive Positive The patient is pre-menopausal.   Reproductive Negative Hormone replacement therapy. Vital Signs     Time BP mm/Hg Pulse /min Resp /min Temp F Ht ft Ht in Ht cm Wt lb Wt kg BMI kg/m2 BSA m2 O2 Sat%   12:54 /70 86 16 98.60 5.0 1.50 156.21 109.80 49.804 20.41 1.47 97     Measured By  Time Measured by   12:54 PM [email or web address]       Physical Exam  Exam Findings Details   Constitutional Normal Well developed.    Respiratory Normal Auscultation - Normal. Effort - Normal.   Cardiovascular Normal Rhythm - Regular. Extra sounds - None. Murmurs - None. Genitourinary * Pelvic deferred. Psychiatric Normal Appropriate mood and affect. Completed Orders (This Visit)  Order Details Reason Side Interpretation Result Initial Treatment Date Region   CAGE-AID Alcohol and Drug Screening    None 0     Patient Health Questionnaire (PHQ-2)    Further testing is not required 0     SDOH    Intervention not needed 0     SDOH    Intervention not needed 0     URINE PREGNANCY/COLOR COMPAR. OnSite    see detail positive       Assessment/Plan  # Detail Type Description    1. Assessment Missed ab (O02.1). Patient Plan - Pt was counseled on suction D&C including risks, expectations, and alternatives. Written consent obtained  - orders and H&P entered into EPIC  - follow up 1 week after D&C   - recommend repeat ultrasound to assess ovarian cyst in 8-12 weeks         2. Assessment Encounter for pregnancy test, result positive (Z32.01). Plan Orders The patient had the following order(s) completed today: URINE PREGNANCY/COLOR COMPAR. OnSite. Obtained on 07/12/2022, Interpretation: see detail, Result details: positive. 3. Assessment Body mass index (BMI) 20.0-20.9, adult (Z68.20). 4. Other Orders Orders not associated to today's assessments. Plan Orders The patient had the following order(s) completed today: CAGE-AID Alcohol and Drug ScreeningNext . Patient Health Questionnaire (PHQ-2)Next . SDOH. SDOH Interpretation: Intervention not needed, Result details: 0.                         Medications (Added, Continued or Stopped today)  Start Date Medication Directions PRN Status PRN Reason Instruction Stop Date   06/23/2022 Cytotec 200 mcg tablet take 3 tablets by oral route at once, then repeat dose in 24h N      06/23/2022 ibuprofen 800 mg tablet take 1 tablet by oral route 3 times every day with food N       Medical Marijuana  INHALATION  N 07/08/2022 Prenatal Gummies 400 mcg-35 mg-25 mg-5 mg chewable tablet 2 PO QD N      06/17/2022 pyridoxine (vitamin B6) 25 mg tablet 1/2 tablet po in am; 1/2 tablet po in pm as needed for nausea N      06/17/2022 Unisom (doxylamine) 25 mg tablet 1/2 tablet po in am; 1/2 tablet po in pm as needed for nausea N          Orders/Procedures/Instructions/Educations  Office Labs  Order     [address]   OnSite see detail positive         Provider:   Eliza Soto MD, Blake Miller  07/12/2022 1:45 PM   Document generated by: Gisella Davis 07/12/2022 01:45 PM

## 2022-07-12 NOTE — H&P (VIEW-ONLY)
>    Patient: Alden Mathew    YOB: 1995   Birth Sex: Female   Date: 2022 01:00 PM   Visit Type: Office Visit - GYN   This 33year old patient presents for Follow up ultrasound post MAB. History of Present Illness:  1. Follow up ultrasound post MAB   26yo  with known fetal demise requests suction D&C for definitive management. Nonviable pregnancy diagnosed at [address], patient had follow up ultrasound [id number] for confirmation/reaffirmation. That ultrasound confirmed no interval growth and no fetal cardiac activity. Of note, there is a left ovarian cyst measuring 4.3cm (5cm on previous ultrasound). Patient reports no bleeding, no pelvic pain, no fever. Still has mild pregnancy symptoms. Screening Tools  Other Screenings:  Encounter Date Performed Date Instrument Score Severity/Interpretation MDD Classification   2022 CAGE-AID Alcohol and Drug Screening 0 None    2022 Patient Health Questionnaire (PHQ-2) 0 Further testing is not required    2022 SDOH 0 Intervention not needed    2022 SDOH 0 Intervention not needed      CAGE ALCOHOL SCREENING TEST      Felt need to cut down drinking? No       Ever felt annoyed by criticism of drinking? No       Had guilty feelings about drinking? No       Ever take morning eye-opener? No       Performed Date:       Score: 0          Gynecologic History  Patient is premenopausal.   Menarche:  16 y.o. (onset)  No hormone replacement therapy. 2022 01:00 PM  Date of last mammogram: 2022. Obstetric History  :2.      Parity: Term:1.  Pre-Term: 0. : 1. Livin. Full term: 1. C-sections: 1. Livin. Spontaneous abortions: 1.     Pregnancy # Birth order Date Delivery Type GA(wks) Labor(hrs) Weight Sex   1  [id number]  low transverse 36w0d  5 lb(s) 6 oz Female   2    8w0d      Past Systemic History    Medical History  (Detailed)  Disease Onset Date Comments   Csection 07/11/2016    Preeclampsia 2016    anxiety     h/o drug use         Surgical History/Management  (Detailed)    Diagnostics History  Status Study Ordered Completed Interpretation  Result / Report   ordered Diag Mamography;incl CAD; Bilateral 03/30/2022       completed Ultrasound,Breast, Single Right 03/31/2022 04/13/2022      completed Ultrasound,Breast,bilateral 03/30/2022 04/13/2022      ordered ULTRASOUND/PREG BRIEF 06/16/2022       ordered ULTRASOUND/PREG TRANSVAG. 07/08/2022           Medications (active prior to today)  Medication Name Sig Description Start Date Stop Date Refilled Rx Elsewhere   Medical Marijuana  INHALATION  //   Y   pyridoxine (vitamin B6) 25 mg tablet 1/2 tablet po in am; 1/2 tablet po in pm as needed for nausea 06/17/2022   N   Unisom (doxylamine) 25 mg tablet 1/2 tablet po in am; 1/2 tablet po in pm as needed for nausea 06/17/2022   N   Cytotec 200 mcg tablet take 3 tablets by oral route at once, then repeat dose in 24h 06/23/2022   N   ibuprofen 800 mg tablet take 1 tablet by oral route 3 times every day with food 06/23/2022   N   Prenatal Gummies 400 mcg-35 mg-25 mg-5 mg chewable tablet 2 PO QD 07/08/2022   N       Medication Reconciliation  Medications reconciled today.     Medication Reviewed  Adherence Medication Name Sig Desc Elsewhere Status   not taking Cytotec 200 mcg tablet take 3 tablets by oral route at once, then repeat dose in 24h N Verified   taking as directed ibuprofen 800 mg tablet take 1 tablet by oral route 3 times every day with food N Verified   not taking pyridoxine (vitamin B6) 25 mg tablet 1/2 tablet po in am; 1/2 tablet po in pm as needed for nausea N Verified   not taking Unisom (doxylamine) 25 mg tablet 1/2 tablet po in am; 1/2 tablet po in pm as needed for nausea N Verified   not taking Prenatal Gummies 400 mcg-35 mg-25 mg-5 mg chewable tablet 2 PO QD N Verified   taking as directed Medical Marijuana INHALATION  Y Verified     Allergies  Ingredient Reaction (Severity) Medication Name Comment   NO KNOWN ALLERGIES            Family History    (Detailed)  Relationship Family Member Name  Age at Death Condition Onset Age Cause of Death   Father    malignant neoplasm of breast in first degree relative  N   Father  N  Alive and well     Mother  N  Alive and well       Social History:  (Detailed)  Tobacco use reviewed. Preferred language is [place]. Born in [place]. EDUCATION/EMPLOYMENT/OCCUPATION  The patient has a(n) high school education. Employment History Status Retired Restrictions   [employer]  Employed full-time       MARITAL STATUS/FAMILY/SOCIAL SUPPORT  Marital status: Single   Tobacco use status: Ex-cigarette smoker. Smoking status: Former smoker. SMOKING STATUS  Type Smoking Status Usage Per Day Years Used Pack Years Total Pack Years   Cigarette Former smoker 0.5 Packs 9.00 4.50 4.50     VAPING USE  Screened for vaping? Yes  Status: Current user  Device type: Advanced Personal Vaporizers (APVs)  Frequency: Occasionally  Current strength: 0.5% (5 mg/mL)  Vaping cessation discussed: Yes    ALCOHOL  There is no history of alcohol use. CAFFEINE  The patient uses caffeine: soda and tea. - 16 oz a day. LIFESTYLE  PETS  - cats. Baptism/SPIRITUAL  Patient agrees to transfusion. HOME ENVIRONMENT/SAFETY  Uses seat belts. Review of Systems  Review of Systems  System Neg/Pos Details   Constitutional Negative See HPI. Reproductive Positive The patient is pre-menopausal.   Reproductive Negative Hormone replacement therapy. Vital Signs     Time BP mm/Hg Pulse /min Resp /min Temp F Ht ft Ht in Ht cm Wt lb Wt kg BMI kg/m2 BSA m2 O2 Sat%   12:54 /70 86 16 98.60 5.0 1.50 156.21 109.80 49.804 20.41 1.47 97     Measured By  Time Measured by   12:54 PM Abril Dominguez       Physical Exam  Exam Findings Details   Constitutional Normal Well developed.    Respiratory Normal Auscultation - Normal. Effort - Normal.   Cardiovascular Normal Rhythm - Regular. Extra sounds - None. Murmurs - None. Genitourinary * Pelvic deferred. Psychiatric Normal Appropriate mood and affect. Completed Orders (This Visit)  Order Details Reason Side Interpretation Result Initial Treatment Date Region   CAGE-AID Alcohol and Drug Screening    None 0     Patient Health Questionnaire (PHQ-2)    Further testing is not required 0     SDOH    Intervention not needed 0     SDOH    Intervention not needed 0     URINE PREGNANCY/COLOR COMPAR. OnSite    see detail positive       Assessment/Plan  # Detail Type Description    1. Assessment Missed ab (O02.1). Patient Plan - Pt was counseled on suction D&C including risks, expectations, and alternatives. Written consent obtained  - orders and H&P entered into EPIC  - follow up 1 week after D&C   - recommend repeat ultrasound to assess ovarian cyst in 8-12 weeks         2. Assessment Encounter for pregnancy test, result positive (Z32.01). Plan Orders The patient had the following order(s) completed today: URINE PREGNANCY/COLOR COMPAR. OnSite. Obtained on 07/12/2022, Interpretation: see detail, Result details: positive. 3. Assessment Body mass index (BMI) 20.0-20.9, adult (Z68.20). 4. Other Orders Orders not associated to today's assessments. Plan Orders The patient had the following order(s) completed today: CAGE-AID Alcohol and Drug ScreeningNext . Patient Health Questionnaire (PHQ-2)Next . SDOH. SDOH Interpretation: Intervention not needed, Result details: 0.                         Medications (Added, Continued or Stopped today)  Start Date Medication Directions PRN Status PRN Reason Instruction Stop Date   06/23/2022 Cytotec 200 mcg tablet take 3 tablets by oral route at once, then repeat dose in 24h N      06/23/2022 ibuprofen 800 mg tablet take 1 tablet by oral route 3 times every day with food N       Medical Marijuana  INHALATION  N 07/08/2022 Prenatal Gummies 400 mcg-35 mg-25 mg-5 mg chewable tablet 2 PO QD N      06/17/2022 pyridoxine (vitamin B6) 25 mg tablet 1/2 tablet po in am; 1/2 tablet po in pm as needed for nausea N      06/17/2022 Unisom (doxylamine) 25 mg tablet 1/2 tablet po in am; 1/2 tablet po in pm as needed for nausea N          Orders/Procedures/Instructions/Educations  Office Labs  Order     [address]   OnSite see detail positive         Provider:   Melba Casas MD, Curt Figueredo  07/12/2022 1:45 PM   Document generated by: Bin Ugalde 07/12/2022 01:45 PM

## 2022-07-12 NOTE — PROGRESS NOTES

## 2022-07-12 NOTE — PROGRESS NOTES
Obstructive Sleep Apnea (WILLIAM) Screening     Patient:  Geoff Holloway    YOB: 1995      Medical Record #:  [id number]                     Date:  7/12/2022     1. Are you a loud and/or regular snorer? []  Yes       [x] No    2. Have you been observed to gasp or stop breathing during sleep? []  Yes       [x] No    3. Do you feel tired or groggy upon awakening or do you awaken with a headache?           []  Yes       [] No    4. Are you often tired or fatigued during the wake time hours? []  Yes       [] No    5. Do you fall asleep sitting, reading, watching TV or driving? []  Yes       [] No    6. Do you often have problems with memory or concentration? []  Yes       [] No    **If patient's score is ? 3 they are considered high risk for WILLIAM. An Anesthesia provider will evaluate the patient and develop a plan of care the day of surgery. Note:  If the patient's BMI is more than 35 kg m¯² , has neck circumference > 40 cm, and/or high blood pressure the risk is greater (© American Sleep Apnea Association, 2006).

## 2022-07-14 ENCOUNTER — ANESTHESIA EVENT (OUTPATIENT)
Dept: OPERATING ROOM | Age: 27
End: 2022-07-14
Payer: COMMERCIAL

## 2022-07-15 ENCOUNTER — ANESTHESIA (OUTPATIENT)
Dept: OPERATING ROOM | Age: 27
End: 2022-07-15
Payer: COMMERCIAL

## 2022-07-15 ENCOUNTER — HOSPITAL ENCOUNTER (OUTPATIENT)
Age: 27
Setting detail: OUTPATIENT SURGERY
Discharge: HOME | End: 2022-07-15
Attending: OBSTETRICS & GYNECOLOGY | Admitting: OBSTETRICS & GYNECOLOGY
Payer: COMMERCIAL

## 2022-07-15 VITALS
WEIGHT: 109 LBS | BODY MASS INDEX: 20.58 KG/M2 | HEIGHT: 61 IN | OXYGEN SATURATION: 99 % | HEART RATE: 62 BPM | SYSTOLIC BLOOD PRESSURE: 102 MMHG | RESPIRATION RATE: 18 BRPM | TEMPERATURE: 97.3 F | DIASTOLIC BLOOD PRESSURE: 51 MMHG

## 2022-07-15 DIAGNOSIS — O02.1 MISSED AB: ICD-10-CM

## 2022-07-15 PROCEDURE — 2580000003 HC RX 258: Performed by: ANESTHESIOLOGY

## 2022-07-15 PROCEDURE — 7100000010 HC PHASE II RECOVERY - FIRST 15 MIN: Performed by: OBSTETRICS & GYNECOLOGY

## 2022-07-15 PROCEDURE — 6360000002 HC RX W HCPCS: Performed by: NURSE ANESTHETIST, CERTIFIED REGISTERED

## 2022-07-15 PROCEDURE — 2709999900 HC NON-CHARGEABLE SUPPLY: Performed by: OBSTETRICS & GYNECOLOGY

## 2022-07-15 PROCEDURE — 3600000012 HC SURGERY LEVEL 2 ADDTL 15MIN: Performed by: OBSTETRICS & GYNECOLOGY

## 2022-07-15 PROCEDURE — 3700000000 HC ANESTHESIA ATTENDED CARE: Performed by: OBSTETRICS & GYNECOLOGY

## 2022-07-15 PROCEDURE — 88305 TISSUE EXAM BY PATHOLOGIST: CPT

## 2022-07-15 PROCEDURE — 3700000001 HC ADD 15 MINUTES (ANESTHESIA): Performed by: OBSTETRICS & GYNECOLOGY

## 2022-07-15 PROCEDURE — 3600000002 HC SURGERY LEVEL 2 BASE: Performed by: OBSTETRICS & GYNECOLOGY

## 2022-07-15 PROCEDURE — 7100000001 HC PACU RECOVERY - ADDTL 15 MIN: Performed by: OBSTETRICS & GYNECOLOGY

## 2022-07-15 PROCEDURE — 7100000000 HC PACU RECOVERY - FIRST 15 MIN: Performed by: OBSTETRICS & GYNECOLOGY

## 2022-07-15 PROCEDURE — 2500000003 HC RX 250 WO HCPCS: Performed by: NURSE ANESTHETIST, CERTIFIED REGISTERED

## 2022-07-15 PROCEDURE — 6370000000 HC RX 637 (ALT 250 FOR IP): Performed by: OBSTETRICS & GYNECOLOGY

## 2022-07-15 RX ORDER — ONDANSETRON 2 MG/ML
4 INJECTION INTRAMUSCULAR; INTRAVENOUS EVERY 30 MIN PRN
Status: DISCONTINUED | OUTPATIENT
Start: 2022-07-15 | End: 2022-07-15 | Stop reason: HOSPADM

## 2022-07-15 RX ORDER — IBUPROFEN 800 MG/1
800 TABLET ORAL EVERY 8 HOURS PRN
Qty: 30 TABLET | Refills: 0 | Status: SHIPPED | OUTPATIENT
Start: 2022-07-15

## 2022-07-15 RX ORDER — FENTANYL CITRATE 50 UG/ML
INJECTION, SOLUTION INTRAMUSCULAR; INTRAVENOUS PRN
Status: DISCONTINUED | OUTPATIENT
Start: 2022-07-15 | End: 2022-07-15 | Stop reason: SDUPTHER

## 2022-07-15 RX ORDER — OXYCODONE HYDROCHLORIDE 5 MG/1
5 TABLET ORAL
Status: DISCONTINUED | OUTPATIENT
Start: 2022-07-15 | End: 2022-07-15 | Stop reason: HOSPADM

## 2022-07-15 RX ORDER — DEXAMETHASONE SODIUM PHOSPHATE 4 MG/ML
INJECTION, SOLUTION INTRA-ARTICULAR; INTRALESIONAL; INTRAMUSCULAR; INTRAVENOUS; SOFT TISSUE PRN
Status: DISCONTINUED | OUTPATIENT
Start: 2022-07-15 | End: 2022-07-15 | Stop reason: SDUPTHER

## 2022-07-15 RX ORDER — SODIUM CHLORIDE 9 MG/ML
INJECTION, SOLUTION INTRAVENOUS PRN
Status: DISCONTINUED | OUTPATIENT
Start: 2022-07-15 | End: 2022-07-15 | Stop reason: HOSPADM

## 2022-07-15 RX ORDER — SODIUM CHLORIDE 0.9 % (FLUSH) 0.9 %
5-40 SYRINGE (ML) INJECTION EVERY 12 HOURS SCHEDULED
Status: DISCONTINUED | OUTPATIENT
Start: 2022-07-15 | End: 2022-07-15 | Stop reason: HOSPADM

## 2022-07-15 RX ORDER — MIDAZOLAM HYDROCHLORIDE 1 MG/ML
2 INJECTION INTRAMUSCULAR; INTRAVENOUS
Status: DISCONTINUED | OUTPATIENT
Start: 2022-07-15 | End: 2022-07-15 | Stop reason: HOSPADM

## 2022-07-15 RX ORDER — MIDAZOLAM HYDROCHLORIDE 1 MG/ML
INJECTION INTRAMUSCULAR; INTRAVENOUS PRN
Status: DISCONTINUED | OUTPATIENT
Start: 2022-07-15 | End: 2022-07-15 | Stop reason: SDUPTHER

## 2022-07-15 RX ORDER — PROPOFOL 10 MG/ML
INJECTION, EMULSION INTRAVENOUS PRN
Status: DISCONTINUED | OUTPATIENT
Start: 2022-07-15 | End: 2022-07-15 | Stop reason: SDUPTHER

## 2022-07-15 RX ORDER — ONDANSETRON 2 MG/ML
INJECTION INTRAMUSCULAR; INTRAVENOUS PRN
Status: DISCONTINUED | OUTPATIENT
Start: 2022-07-15 | End: 2022-07-15 | Stop reason: SDUPTHER

## 2022-07-15 RX ORDER — DIPHENHYDRAMINE HYDROCHLORIDE 50 MG/ML
6.25 INJECTION INTRAMUSCULAR; INTRAVENOUS
Status: DISCONTINUED | OUTPATIENT
Start: 2022-07-15 | End: 2022-07-15 | Stop reason: HOSPADM

## 2022-07-15 RX ORDER — SODIUM CHLORIDE 0.9 % (FLUSH) 0.9 %
5-40 SYRINGE (ML) INJECTION PRN
Status: DISCONTINUED | OUTPATIENT
Start: 2022-07-15 | End: 2022-07-15 | Stop reason: HOSPADM

## 2022-07-15 RX ORDER — DOXYCYCLINE HYCLATE 100 MG
200 TABLET ORAL ONCE
Status: COMPLETED | OUTPATIENT
Start: 2022-07-15 | End: 2022-07-15

## 2022-07-15 RX ORDER — MEPERIDINE HYDROCHLORIDE 50 MG/ML
12.5 INJECTION INTRAMUSCULAR; INTRAVENOUS; SUBCUTANEOUS EVERY 5 MIN PRN
Status: DISCONTINUED | OUTPATIENT
Start: 2022-07-15 | End: 2022-07-15 | Stop reason: HOSPADM

## 2022-07-15 RX ORDER — SODIUM CHLORIDE, SODIUM LACTATE, POTASSIUM CHLORIDE, CALCIUM CHLORIDE 600; 310; 30; 20 MG/100ML; MG/100ML; MG/100ML; MG/100ML
INJECTION, SOLUTION INTRAVENOUS CONTINUOUS
Status: DISCONTINUED | OUTPATIENT
Start: 2022-07-15 | End: 2022-07-15 | Stop reason: HOSPADM

## 2022-07-15 RX ORDER — KETOROLAC TROMETHAMINE 30 MG/ML
INJECTION, SOLUTION INTRAMUSCULAR; INTRAVENOUS PRN
Status: DISCONTINUED | OUTPATIENT
Start: 2022-07-15 | End: 2022-07-15 | Stop reason: SDUPTHER

## 2022-07-15 RX ORDER — SODIUM CHLORIDE 9 MG/ML
25 INJECTION, SOLUTION INTRAVENOUS PRN
Status: DISCONTINUED | OUTPATIENT
Start: 2022-07-15 | End: 2022-07-15 | Stop reason: HOSPADM

## 2022-07-15 RX ORDER — LIDOCAINE HYDROCHLORIDE 10 MG/ML
INJECTION, SOLUTION INFILTRATION; PERINEURAL PRN
Status: DISCONTINUED | OUTPATIENT
Start: 2022-07-15 | End: 2022-07-15 | Stop reason: SDUPTHER

## 2022-07-15 RX ADMIN — DEXAMETHASONE SODIUM PHOSPHATE 8 MG: 4 INJECTION, SOLUTION INTRAMUSCULAR; INTRAVENOUS at 14:12

## 2022-07-15 RX ADMIN — LIDOCAINE HYDROCHLORIDE 50 MG: 10 INJECTION, SOLUTION INFILTRATION; PERINEURAL at 13:57

## 2022-07-15 RX ADMIN — FENTANYL CITRATE 25 MCG: 50 INJECTION INTRAMUSCULAR; INTRAVENOUS at 14:07

## 2022-07-15 RX ADMIN — FENTANYL CITRATE 25 MCG: 50 INJECTION INTRAMUSCULAR; INTRAVENOUS at 13:57

## 2022-07-15 RX ADMIN — ONDANSETRON 4 MG: 2 INJECTION INTRAMUSCULAR; INTRAVENOUS at 14:12

## 2022-07-15 RX ADMIN — PROPOFOL 160 MG: 10 INJECTION, EMULSION INTRAVENOUS at 13:57

## 2022-07-15 RX ADMIN — KETOROLAC TROMETHAMINE 30 MG: 30 INJECTION, SOLUTION INTRAMUSCULAR; INTRAVENOUS at 14:12

## 2022-07-15 RX ADMIN — DOXYCYCLINE HYCLATE 200 MG: 100 TABLET, COATED ORAL at 13:32

## 2022-07-15 RX ADMIN — SODIUM CHLORIDE, POTASSIUM CHLORIDE, SODIUM LACTATE AND CALCIUM CHLORIDE: 600; 310; 30; 20 INJECTION, SOLUTION INTRAVENOUS at 13:32

## 2022-07-15 RX ADMIN — MIDAZOLAM HYDROCHLORIDE 1 MG: 2 INJECTION, SOLUTION INTRAMUSCULAR; INTRAVENOUS at 13:49

## 2022-07-15 ASSESSMENT — PAIN - FUNCTIONAL ASSESSMENT: PAIN_FUNCTIONAL_ASSESSMENT: 0-10

## 2022-07-15 NOTE — ANESTHESIA POSTPROCEDURE EVALUATION
Department of Anesthesiology  Postprocedure Note    Patient: Melvin Esparza  MRN: [id number]  YOB: 1995  Date of evaluation: 7/15/2022      Procedure Summary     Date: 07/15/22 Room / Location: [address] [place] [address]    Anesthesia Start: 1153 Anesthesia Stop: 2163    Procedure: SUCTION DILATATION AND CURETTAGE (Vagina ) Diagnosis:       Missed ab      (Missed ab [O02.1])    Surgeons: Lina Luz MD Responsible Provider: Micah Clarke MD    Anesthesia Type: general ASA Status: 2          Anesthesia Type: No value filed.     Patricia Phase I: Patricia Score: 9    Patricia Phase II: Patricia Score: 10      Anesthesia Post Evaluation    Comments: Postoperative Anesthesia Note    Name:    Melvin Esparza  MRN:      [id number]    Patient Vitals in the past 12 hrs:  07/15/22 1505, BP:(!) 102/51, Pulse:62, Resp:18, SpO2:99 %  07/15/22 1500, BP:(!) 100/52, Pulse:61, Resp:18, SpO2:100 %  07/15/22 1455, BP:(!) 102/57, Pulse:59, Resp:18, SpO2:99 %  07/15/22 1450, BP:(!) 99/53, Pulse:57, Resp:23, SpO2:100 %  07/15/22 1445, BP:(!) 105/50, Pulse:84, Resp:20, SpO2:99 %  07/15/22 1442, BP:117/76, Pulse:55, Resp:16, SpO2:100 %  07/15/22 1440, Pulse:61, Resp:20, SpO2:100 %  07/15/22 1435, BP:110/65, Pulse:(!) 48, Resp:16, SpO2:100 %  07/15/22 1430, BP:(!) 103/59, Pulse:(!) 48, Resp:16, SpO2:100 %  07/15/22 1425, BP:(!) 102/59, Temp:97.3 °F (36.3 °C), Pulse:52, Resp:12, SpO2:100 %  07/15/22 1305, BP:110/69, Temp:97.8 °F (36.6 °C), Temp src:Temporal, Pulse:65, Resp:16, SpO2:100 %     LABS:    CBC  Lab Results       Component                Value               Date/Time                  WBC                      8.2                 09/28/2018 01:31 PM        HGB                      15.2                09/28/2018 01:31 PM        HCT                      43.3                09/28/2018 01:31 PM        PLT                      281                 09/28/2018 01:31 PM   RENAL  Lab Results Component                Value               Date/Time                  NA                       143                 09/28/2018 01:33 PM        K                        4.3                 09/28/2018 01:33 PM        CL                       102                 09/28/2018 01:33 PM        CO2                      28                  09/28/2018 01:33 PM        BUN                      14                  09/28/2018 01:33 PM        CREATININE               <0.5 (L)            09/28/2018 01:33 PM        GLUCOSE                  102 (H)             09/28/2018 01:33 PM   COAGS  Lab Results       Component                Value               Date/Time                  PROTIME                  9.5 (L)             07/10/2016 01:48 AM        INR                      0.84 (L)            07/10/2016 01:48 AM        APTT                     28.0                07/10/2016 01:48 AM     Intake & Output: In: -   Out: 25     Nausea & Vomiting:  No    Level of Consciousness:  Awake    Pain Assessment:  Adequate analgesia    Anesthesia Complications:  No apparent anesthetic complications    SUMMARY      Vital signs stable  OK to discharge from Stage I post anesthesia care.   Care transferred from Anesthesiology department on discharge from perioperative area

## 2022-07-15 NOTE — ANESTHESIA PRE PROCEDURE
Department of Anesthesiology  Preprocedure Note       Name:  Muriel Benavides   Age:  33 y.o.  :  1995                                          MRN:  [id number]         Date:  7/15/2022      Surgeon: Sandee Luna):  Karen Valentin MD    Procedure: Procedure(s):  SUCTION DILATATION AND CURETTAGE    Medications prior to admission:   Prior to Admission medications    Medication Sig Start Date End Date Taking? Authorizing Provider   IBUPROFEN PO Take by mouth as needed   Yes Historical Provider, MD       Current medications:    Current Facility-Administered Medications   Medication Dose Route Frequency Provider Last Rate Last Admin    lactated ringers infusion   IntraVENous Continuous Zoya Oates MD        lidocaine 1 % (PF) injection 0.1 mL  0.1 mL IntraDERmal Once PRN Zoya Oates MD        sodium chloride flush 0.9 % injection 5-40 mL  5-40 mL IntraVENous 2 times per day Karen Valentin MD        sodium chloride flush 0.9 % injection 5-40 mL  5-40 mL IntraVENous PRN Karen Valentin MD        0.9 % sodium chloride infusion   IntraVENous PRN Karen Valentin MD        doxycycline hyclate (VIBRA-TABS) tablet 200 mg  200 mg Oral Once Karen Valentin MD           Allergies:     Allergies   Allergen Reactions    Ciprofloxacin Rash       Problem List:    Patient Active Problem List   Diagnosis Code    Pelvic pain in female R10.2    Preeclampsia O14.90    Missed  with fetal demise before 21 completed weeks of gestation O46.2       Past Medical History:        Diagnosis Date    Drug abuse ([address]     Hepatitis C     Preeclampsia        Past Surgical History:        Procedure Laterality Date     SECTION      US BREAST NEEDLE BIOPSY RIGHT Right 2022    US BREAST NEEDLE BIOPSY RIGHT 2022 [facility]       Social History:    Social History     Tobacco Use    Smoking status: Former     Packs/day: 0.50     Types: Cigarettes     Quit date: 2020 Years since quittin.0    Smokeless tobacco: Never   Substance Use Topics    Alcohol use: Yes     Comment: holidays                                Counseling given: Not Answered      Vital Signs (Current):   Vitals:    22 1441   Weight: 109 lb (49.4 kg)   Height: 5' 1\" (1.549 m)                                              BP Readings from Last 3 Encounters:   10/08/19 113/68   04/10/19 (!) 132/100   18 111/66       NPO Status:                                                                                 BMI:   Wt Readings from Last 3 Encounters:   22 109 lb (49.4 kg)   20 140 lb (63.5 kg)   10/08/19 100 lb (45.4 kg)     Body mass index is 20.6 kg/m². CBC:   Lab Results   Component Value Date/Time    WBC 8.2 2018 01:31 PM    RBC 4.95 2018 01:31 PM    HGB 15.2 2018 01:31 PM    HCT 43.3 2018 01:31 PM    MCV 87.5 2018 01:31 PM    RDW 12.9 2018 01:31 PM     2018 01:31 PM       CMP:   Lab Results   Component Value Date/Time     2018 01:33 PM    K 4.3 2018 01:33 PM     2018 01:33 PM    CO2 28 2018 01:33 PM    BUN 14 2018 01:33 PM    CREATININE <0.5 2018 01:33 PM    GFRAA >60 2018 01:33 PM    AGRATIO 1.6 2018 01:33 PM    LABGLOM >60 2018 01:33 PM    GLUCOSE 102 2018 01:33 PM    PROT 7.4 2018 01:33 PM    CALCIUM 9.7 2018 01:33 PM    BILITOT 0.4 2018 01:33 PM    ALKPHOS 101 2018 01:33 PM    AST 17 2018 01:33 PM    ALT 25 2018 01:33 PM       POC Tests: No results for input(s): POCGLU, POCNA, POCK, POCCL, POCBUN, POCHEMO, POCHCT in the last 72 hours.     Coags:   Lab Results   Component Value Date/Time    PROTIME 9.5 07/10/2016 01:48 AM    INR 0.84 07/10/2016 01:48 AM    APTT 28.0 07/10/2016 01:48 AM       HCG (If Applicable):   Lab Results   Component Value Date    PREGTESTUR Negative 2018        ABGs: No results found for: PHART, PO2ART, UPD9CAG, XRF1IKT, BEART, [id number]     Type & Screen (If Applicable):  No results found for: LABABO, LABRH    Drug/Infectious Status (If Applicable):  No results found for: HIV, HEPCAB    COVID-19 Screening (If Applicable): No results found for: COVID19        Anesthesia Evaluation  Patient summary reviewed and Nursing notes reviewed no history of anesthetic complications:   Airway: Mallampati: II     Neck ROM: full     Dental:          Pulmonary:                              Cardiovascular:    (+) hypertension:,                   Neuro/Psych:               GI/Hepatic/Renal:   (+) hepatitis:, liver disease:,           Endo/Other:                     Abdominal:             Vascular: Other Findings:           Anesthesia Plan      general     ASA 2       Induction: intravenous. Anesthetic plan and risks discussed with patient. Plan discussed with CRNA.                     Isis Carmona MD   7/15/2022

## 2022-07-15 NOTE — PROGRESS NOTES
Pt arrived to PACU in stable condition. RR adequate. Oral airway in place. Vitals stable. No vaginal bleeding noted at this time. IVF infusing.

## 2022-07-15 NOTE — PROGRESS NOTES
Pt to [facility]. Pt gave purse and cellphone to Alexx (boyfriend) and clothing remains in locker. VSS. IV access obtained. Pt unable to urinate as she had before arriving to [facility] so blood HCG sent to the lab. Pt consent and procedure verified. Pt has no questions/concerns.

## 2022-10-17 ENCOUNTER — HOSPITAL ENCOUNTER (OUTPATIENT)
Dept: WOMENS IMAGING | Age: 27
Discharge: HOME | End: 2022-10-17
Payer: COMMERCIAL

## 2022-10-17 DIAGNOSIS — N63.10 MASS OF RIGHT BREAST, UNSPECIFIED QUADRANT: ICD-10-CM

## 2022-10-17 PROCEDURE — 76642 ULTRASOUND BREAST LIMITED: CPT

## 2023-01-26 LAB
ABO, EXTERNAL RESULT: NORMAL
HEP B, EXTERNAL RESULT: NEGATIVE
HIV, EXTERNAL RESULT: NEGATIVE
RH FACTOR, EXTERNAL RESULT: POSITIVE
RUBELLA TITER, EXTERNAL RESULT: NORMAL

## 2023-03-13 ENCOUNTER — HOSPITAL ENCOUNTER (EMERGENCY)
Age: 28
Discharge: HOME | End: 2023-03-13
Payer: COMMERCIAL

## 2023-03-13 VITALS
RESPIRATION RATE: 18 BRPM | OXYGEN SATURATION: 99 % | SYSTOLIC BLOOD PRESSURE: 121 MMHG | HEART RATE: 82 BPM | DIASTOLIC BLOOD PRESSURE: 67 MMHG | TEMPERATURE: 98.1 F

## 2023-03-13 DIAGNOSIS — M54.50 RIGHT-SIDED LOW BACK PAIN WITHOUT SCIATICA, UNSPECIFIED CHRONICITY: Primary | ICD-10-CM

## 2023-03-13 PROCEDURE — 99282 EMERGENCY DEPT VISIT SF MDM: CPT

## 2023-03-13 ASSESSMENT — PAIN DESCRIPTION - ORIENTATION: ORIENTATION: RIGHT

## 2023-03-13 ASSESSMENT — PAIN - FUNCTIONAL ASSESSMENT: PAIN_FUNCTIONAL_ASSESSMENT: 0-10

## 2023-03-13 ASSESSMENT — PAIN SCALES - GENERAL: PAINLEVEL_OUTOF10: 7

## 2023-03-13 ASSESSMENT — PAIN DESCRIPTION - LOCATION: LOCATION: ABDOMEN

## 2023-03-13 NOTE — ED PROVIDER NOTES
[facility]  ED  EMERGENCY DEPARTMENT ENCOUNTER        Pt Name: Navin Tripathi  MRN: [id number]  [place] 1995  Date of evaluation: 3/13/2023  Provider: Lexie Wells PA-C  PCP: Ange Obando  Note Started: 2:59 PM EDT 3/13/23      JOSELO. I have evaluated this patient. My supervising physician was available for consultation. CHIEF COMPLAINT       Chief Complaint   Patient presents with    Other     Pt reports swelling to lower right side with burning pain. For past few weeks. HISTORY OF PRESENT ILLNESS: 1 or more Elements     History From: Patient    Limitations to history : None    Navin Tripathi is a 33 y.o. female who presents to the emergency department with complaint of swelling involving the right lower flank area. This is just above the iliac crest and somewhat posteriorly. She states she has noticed this intermittently over the past 3 weeks. Mildly tender at times. No erythema. Not nodular or masslike. She is 15 weeks pregnant. She uses Tylenol which seems to help. She reports no gastrointestinal or urinary complaints. She has no midline spinal pain. No radicular symptoms. Nursing Notes were all reviewed and agreed with or any disagreements were addressed in the HPI. REVIEW OF SYSTEMS :      Review of Systems    Positives and Pertinent negatives as per HPI.      SURGICAL HISTORY     Past Surgical History:   Procedure Laterality Date     SECTION      DILATION AND CURETTAGE OF UTERUS N/A 7/15/2022    SUCTION DILATATION AND CURETTAGE performed by Kwame Garcia MD at [facility] [address] [facility] RIGHT Right 2022     BREAST NEEDLE BIOPSY RIGHT 2022 [address] [place] [address]       Discharge Medication List as of 3/13/2023  3:04 PM        CONTINUE these medications which have NOT CHANGED    Details   ibuprofen (ADVIL;MOTRIN) 800 MG tablet Take 1 tablet by mouth every 8 hours as needed for Pain, Disp-30 tablet, R-0Normal             ALLERGIES     Ciprofloxacin    FAMILYHISTORY       Family History   Problem Relation Age of Onset    Arthritis Mother     High Blood Pressure Mother     Arthritis Maternal Grandfather     Atrial Fibrillation Maternal Grandfather     Diabetes Paternal Grandfather     No Known Problems Father         SOCIAL HISTORY       Social History     Tobacco Use    Smoking status: Former     Packs/day: 0.50     Types: Cigarettes, E-Cigarettes     Quit date: 2020     Years since quittin.6    Smokeless tobacco: Never   Vaping Use    Vaping Use: Every day    Substances: Nicotine, Flavoring    Devices: Disposable   Substance Use Topics    Alcohol use: Yes     Comment: holidays    Drug use: Yes     Frequency: 7.0 times per week     Types: Marijuana (Weed)     Comment: last used heroin on        SCREENINGS        Krystina Coma Scale  Eye Opening: Spontaneous  Best Verbal Response: Oriented  Best Motor Response: Obeys commands  Krystina Coma Scale Score: 15                CIWA Assessment  BP: 121/67  Heart Rate: 82           PHYSICAL EXAM  1 or more Elements     ED Triage Vitals [23 1407]   BP Temp Temp Source Heart Rate Resp SpO2 Height Weight   121/67 98.1 °F (36.7 °C) Oral 82 18 99 % -- --       Physical Exam  Vitals and nursing note reviewed. Constitutional:       Appearance: Normal appearance. She is well-developed and normal weight. HENT:      Head: Normocephalic and atraumatic. Right Ear: External ear normal.      Left Ear: External ear normal.   Eyes:      General: No scleral icterus. Right eye: No discharge. Left eye: No discharge. Conjunctiva/sclera: Conjunctivae normal.   Cardiovascular:      Rate and Rhythm: Normal rate and regular rhythm. Heart sounds: Normal heart sounds. Pulmonary:      Effort: Pulmonary effort is normal.      Breath sounds: Normal breath sounds. Abdominal:      General: Abdomen is flat. Bowel sounds are normal.      Palpations: Abdomen is soft. Tenderness: There is no abdominal tenderness. Musculoskeletal:         General: Normal range of motion. Cervical back: Normal range of motion and neck supple. Back:       Right lower leg: No edema. Left lower leg: No edema. Comments: On my examination I honestly do not find any asymmetry. Tissue appears same and symmetrical.  I palpate no masses. I note no erythema or skin disruptions. Skin:     General: Skin is warm and dry. Neurological:      General: No focal deficit present. Mental Status: She is alert and oriented to person, place, and time. Mental status is at baseline. Psychiatric:         Mood and Affect: Mood normal.         Behavior: Behavior normal.         Thought Content: Thought content normal.         Judgment: Judgment normal.       DIAGNOSTIC RESULTS   LABS:    Labs Reviewed - No data to display    When ordered only abnormal lab results are displayed. All other labs were within normal range or not returned as of this dictation. EKG: When ordered, EKG's are interpreted by the Emergency Department Physician in the absence of a cardiologist.  Please see their note for interpretation of EKG. RADIOLOGY:   Non-plain film images such as CT, Ultrasound and MRI are read by the radiologist. Plain radiographic images are visualized and preliminarily interpreted by the ED Provider with the below findings:      Interpretation per the Radiologist below, if available at the time of this note:    No orders to display     No results found. No results found. PROCEDURES   Unless otherwise noted below, none     Procedures    CRITICAL CARE TIME (.cctime)       PAST MEDICAL HISTORY      has a past medical history of Drug abuse ([facility] [address] Hepatitis C, and Preeclampsia.      EMERGENCY DEPARTMENT COURSE and DIFFERENTIAL DIAGNOSIS/MDM:   Vitals:    Vitals:    03/13/23 1407   BP: 121/67   Pulse: 82   Resp: 18   Temp: 98.1 °F (36.7 °C)   TempSrc: Oral   SpO2: 99%       Patient was given the following medications:  Medications - No data to display          Is this patient to be included in the SEP-1 Core Measure due to severe sepsis or septic shock? No   Exclusion criteria - the patient is NOT to be included for SEP-1 Core Measure due to: Infection is not suspected    Chronic Conditions affecting care: None   has a past medical history of Drug abuse ([address] Hepatitis C, and Preeclampsia. CONSULTS: (Who and What was discussed)  None    Social Determinants Significantly Affecting Health : None    Records Reviewed (External and Source) none    CC/HPI Summary, DDx, ED Course, and Reassessment:    I do not have a clear understanding as to the patient complaint. I find a normal exam.  The patient's lower flank areas left is symmetric to the right. I find no masses or other physical concerns or changes. Patient reassured. Recommend Tylenol if needed. She is 15 weeks pregnant. Recommend follow with gynecology and PCP. Disposition Considerations (tests considered but not done, Admit vs D/C, Shared Decision Making, Pt Expectation of Test or Tx.):     Patient presenting with asymmetry and swelling in the area just above the iliac crest and somewhat posteriorly on the right. I find no clear or obvious asymmetry. Patient reassured. Imaging not obtained due to 15 weeks pregnant. The patient will be discharged to home and follow with PCP as well as gynecology. Recommending Tylenol for pain control. I am the Primary Clinician of Record. FINAL IMPRESSION      1.  Right-sided low back pain without sciatica, unspecified chronicity          DISPOSITION/PLAN     DISPOSITION Decision To Discharge 03/13/2023 02:57:59 PM      PATIENT REFERRED TO:  Lia Salazar  [address]  [phone]    Schedule an appointment as soon as possible for a visit in 3 days  As needed    Your gynecologist    Schedule an appointment as soon as possible for a visit in 3 days      [facility]  ED  [address] [facility] [address]  Go to   If symptoms worsen      DISCHARGE MEDICATIONS:  Discharge Medication List as of 3/13/2023  3:04 PM          DISCONTINUED MEDICATIONS:  Discharge Medication List as of 3/13/2023  3:04 PM                 (Please note that portions of this note were completed with a voice recognition program.  Efforts were made to edit the dictations but occasionally words are mis-transcribed. )    Lexie Wells PA-C (electronically signed)       Lexie Wells PA-C  03/13/23 4434

## 2023-03-13 NOTE — DISCHARGE INSTRUCTIONS
On examination I find no obvious swelling. There is no redness, lumps or masses. In this area of the body would consist of skin, subcutaneous tissue, muscle and bone. This cannot represent kidneys or intra-abdominal structures. I do recommend heat and massage. Tylenol for pain. Follow with your healthcare provider and your gynecologist.  I do understand you are 15 weeks pregnant.

## 2023-04-17 ENCOUNTER — HOSPITAL ENCOUNTER (OUTPATIENT)
Dept: WOMENS IMAGING | Age: 28
Discharge: HOME | End: 2023-04-17
Payer: COMMERCIAL

## 2023-04-17 DIAGNOSIS — N63.10 MASS OF RIGHT BREAST, UNSPECIFIED QUADRANT: ICD-10-CM

## 2023-04-17 PROCEDURE — 76642 ULTRASOUND BREAST LIMITED: CPT

## 2023-05-03 ENCOUNTER — HOSPITAL ENCOUNTER (OUTPATIENT)
Dept: WOMENS IMAGING | Age: 28
Discharge: HOME | End: 2023-05-03
Payer: COMMERCIAL

## 2023-05-03 DIAGNOSIS — R92.8 ABNORMAL MAMMOGRAM: ICD-10-CM

## 2023-05-03 PROCEDURE — 88305 TISSUE EXAM BY PATHOLOGIST: CPT

## 2023-05-03 PROCEDURE — 19083 BX BREAST 1ST LESION US IMAG: CPT

## 2023-05-04 ENCOUNTER — TELEPHONE (OUTPATIENT)
Dept: WOMENS IMAGING | Age: 28
End: 2023-05-04

## 2023-05-04 NOTE — TELEPHONE ENCOUNTER
Patient's breast surgeon's office will call patient with her breast biopsy results . Mitra Trujillo RN

## 2023-05-19 LAB — RPR, EXTERNAL RESULT: NON REACTIVE

## 2023-07-22 ENCOUNTER — HOSPITAL ENCOUNTER (OUTPATIENT)
Dept: ULTRASOUND IMAGING | Age: 28
Discharge: HOME | End: 2023-07-22
Attending: OBSTETRICS & GYNECOLOGY
Payer: COMMERCIAL

## 2023-07-22 DIAGNOSIS — R10.11 RUQ PAIN: ICD-10-CM

## 2023-07-22 DIAGNOSIS — O09.93 SUPERVISION OF HIGH RISK PREGNANCY IN THIRD TRIMESTER: ICD-10-CM

## 2023-07-22 PROCEDURE — 76705 ECHO EXAM OF ABDOMEN: CPT

## 2023-08-03 LAB — GBS, EXTERNAL RESULT: NEGATIVE

## 2023-08-28 ENCOUNTER — HOSPITAL ENCOUNTER (INPATIENT)
Age: 28
LOS: 2 days | Discharge: HOME | DRG: 540 | End: 2023-08-30
Attending: STUDENT IN AN ORGANIZED HEALTH CARE EDUCATION/TRAINING PROGRAM | Admitting: STUDENT IN AN ORGANIZED HEALTH CARE EDUCATION/TRAINING PROGRAM
Payer: COMMERCIAL

## 2023-08-28 ENCOUNTER — ANESTHESIA EVENT (OUTPATIENT)
Dept: LABOR AND DELIVERY | Age: 28
DRG: 540 | End: 2023-08-28
Payer: COMMERCIAL

## 2023-08-28 ENCOUNTER — ANESTHESIA (OUTPATIENT)
Dept: LABOR AND DELIVERY | Age: 28
DRG: 540 | End: 2023-08-28
Payer: COMMERCIAL

## 2023-08-28 PROBLEM — O02.1 MISSED ABORTION WITH FETAL DEMISE BEFORE 20 COMPLETED WEEKS OF GESTATION: Status: RESOLVED | Noted: 2022-07-12 | Resolved: 2023-08-28

## 2023-08-28 PROBLEM — O09.93 HIGH-RISK PREGNANCY, THIRD TRIMESTER: Status: ACTIVE | Noted: 2023-08-28

## 2023-08-28 PROBLEM — Z98.891 PREVIOUS CESAREAN SECTION: Status: ACTIVE | Noted: 2023-08-28

## 2023-08-28 PROBLEM — Z98.891 S/P PRIMARY LOW TRANSVERSE C-SECTION: Status: ACTIVE | Noted: 2023-08-28

## 2023-08-28 LAB
ABO + RH BLD: NORMAL
AMPHETAMINES UR QL SCN>1000 NG/ML: ABNORMAL
BARBITURATES UR QL SCN>200 NG/ML: ABNORMAL
BASOPHILS # BLD: 0.1 K/UL (ref 0–0.2)
BASOPHILS NFR BLD: 0.4 %
BENZODIAZ UR QL SCN>200 NG/ML: ABNORMAL
BLD GP AB SCN SERPL QL: NORMAL
BUPRENORPHINE+NOR UR QL SCN: ABNORMAL
CANNABINOIDS UR QL SCN>50 NG/ML: POSITIVE
COCAINE UR QL SCN: ABNORMAL
DEPRECATED RDW RBC AUTO: 13.2 % (ref 12.4–15.4)
DRUG SCREEN COMMENT UR-IMP: ABNORMAL
EOSINOPHIL # BLD: 0.1 K/UL (ref 0–0.6)
EOSINOPHIL NFR BLD: 0.8 %
FENTANYL SCREEN, URINE: ABNORMAL
HCT VFR BLD AUTO: 37.5 % (ref 36–48)
HGB BLD-MCNC: 13 G/DL (ref 12–16)
LYMPHOCYTES # BLD: 3.6 K/UL (ref 1–5.1)
LYMPHOCYTES NFR BLD: 21.6 %
MCH RBC QN AUTO: 30.9 PG (ref 26–34)
MCHC RBC AUTO-ENTMCNC: 34.6 G/DL (ref 31–36)
MCV RBC AUTO: 89.5 FL (ref 80–100)
METHADONE UR QL SCN>300 NG/ML: ABNORMAL
MONOCYTES # BLD: 0.6 K/UL (ref 0–1.3)
MONOCYTES NFR BLD: 3.6 %
NEUTROPHILS # BLD: 12.1 K/UL (ref 1.7–7.7)
NEUTROPHILS NFR BLD: 73.6 %
OPIATES UR QL SCN>300 NG/ML: ABNORMAL
OXYCODONE UR QL SCN: ABNORMAL
PCP UR QL SCN>25 NG/ML: ABNORMAL
PH UR STRIP: 6 [PH]
PLATELET # BLD AUTO: 370 K/UL (ref 135–450)
PMV BLD AUTO: 8.8 FL (ref 5–10.5)
RBC # BLD AUTO: 4.19 M/UL (ref 4–5.2)
WBC # BLD AUTO: 16.5 K/UL (ref 4–11)

## 2023-08-28 PROCEDURE — 85025 COMPLETE CBC W/AUTO DIFF WBC: CPT

## 2023-08-28 PROCEDURE — 1220000000 HC SEMI PRIVATE OB R&B

## 2023-08-28 PROCEDURE — 2580000003 HC RX 258: Performed by: STUDENT IN AN ORGANIZED HEALTH CARE EDUCATION/TRAINING PROGRAM

## 2023-08-28 PROCEDURE — 3700000000 HC ANESTHESIA ATTENDED CARE: Performed by: STUDENT IN AN ORGANIZED HEALTH CARE EDUCATION/TRAINING PROGRAM

## 2023-08-28 PROCEDURE — 86901 BLOOD TYPING SEROLOGIC RH(D): CPT

## 2023-08-28 PROCEDURE — 86900 BLOOD TYPING SEROLOGIC ABO: CPT

## 2023-08-28 PROCEDURE — 6360000002 HC RX W HCPCS: Performed by: NURSE ANESTHETIST, CERTIFIED REGISTERED

## 2023-08-28 PROCEDURE — 7100000000 HC PACU RECOVERY - FIRST 15 MIN: Performed by: STUDENT IN AN ORGANIZED HEALTH CARE EDUCATION/TRAINING PROGRAM

## 2023-08-28 PROCEDURE — 3700000001 HC ADD 15 MINUTES (ANESTHESIA): Performed by: STUDENT IN AN ORGANIZED HEALTH CARE EDUCATION/TRAINING PROGRAM

## 2023-08-28 PROCEDURE — 2580000003 HC RX 258: Performed by: NURSE ANESTHETIST, CERTIFIED REGISTERED

## 2023-08-28 PROCEDURE — 3609079900 HC CESAREAN SECTION: Performed by: STUDENT IN AN ORGANIZED HEALTH CARE EDUCATION/TRAINING PROGRAM

## 2023-08-28 PROCEDURE — 6360000002 HC RX W HCPCS: Performed by: STUDENT IN AN ORGANIZED HEALTH CARE EDUCATION/TRAINING PROGRAM

## 2023-08-28 PROCEDURE — 86318 IA INFECTIOUS AGENT ANTIBODY: CPT

## 2023-08-28 PROCEDURE — 7100000001 HC PACU RECOVERY - ADDTL 15 MIN: Performed by: STUDENT IN AN ORGANIZED HEALTH CARE EDUCATION/TRAINING PROGRAM

## 2023-08-28 PROCEDURE — 86850 RBC ANTIBODY SCREEN: CPT

## 2023-08-28 PROCEDURE — 2709999900 HC NON-CHARGEABLE SUPPLY: Performed by: STUDENT IN AN ORGANIZED HEALTH CARE EDUCATION/TRAINING PROGRAM

## 2023-08-28 PROCEDURE — 80307 DRUG TEST PRSMV CHEM ANLYZR: CPT

## 2023-08-28 RX ORDER — PROMETHAZINE HYDROCHLORIDE 25 MG/ML
25 INJECTION, SOLUTION INTRAMUSCULAR; INTRAVENOUS EVERY 6 HOURS PRN
Status: DISCONTINUED | OUTPATIENT
Start: 2023-08-28 | End: 2023-08-30 | Stop reason: HOSPADM

## 2023-08-28 RX ORDER — SODIUM CHLORIDE 9 MG/ML
INJECTION, SOLUTION INTRAVENOUS PRN
Status: DISCONTINUED | OUTPATIENT
Start: 2023-08-28 | End: 2023-08-28

## 2023-08-28 RX ORDER — OXYCODONE HYDROCHLORIDE 5 MG/1
5 TABLET ORAL EVERY 4 HOURS PRN
Status: DISCONTINUED | OUTPATIENT
Start: 2023-08-28 | End: 2023-08-30 | Stop reason: HOSPADM

## 2023-08-28 RX ORDER — ONDANSETRON 2 MG/ML
4 INJECTION INTRAMUSCULAR; INTRAVENOUS EVERY 6 HOURS PRN
Status: DISCONTINUED | OUTPATIENT
Start: 2023-08-28 | End: 2023-08-28

## 2023-08-28 RX ORDER — SODIUM CHLORIDE 9 MG/ML
INJECTION, SOLUTION INTRAVENOUS PRN
Status: DISCONTINUED | OUTPATIENT
Start: 2023-08-28 | End: 2023-08-30 | Stop reason: HOSPADM

## 2023-08-28 RX ORDER — SODIUM CHLORIDE 0.9 % (FLUSH) 0.9 %
5-40 SYRINGE (ML) INJECTION PRN
Status: DISCONTINUED | OUTPATIENT
Start: 2023-08-28 | End: 2023-08-30 | Stop reason: HOSPADM

## 2023-08-28 RX ORDER — SODIUM CHLORIDE 0.9 % (FLUSH) 0.9 %
5-40 SYRINGE (ML) INJECTION EVERY 12 HOURS SCHEDULED
Status: DISCONTINUED | OUTPATIENT
Start: 2023-08-28 | End: 2023-08-30 | Stop reason: HOSPADM

## 2023-08-28 RX ORDER — LANOLIN 100 %
OINTMENT (GRAM) TOPICAL
Status: DISCONTINUED | OUTPATIENT
Start: 2023-08-28 | End: 2023-08-30 | Stop reason: HOSPADM

## 2023-08-28 RX ORDER — SODIUM CHLORIDE 0.9 % (FLUSH) 0.9 %
10 SYRINGE (ML) INJECTION PRN
Status: DISCONTINUED | OUTPATIENT
Start: 2023-08-28 | End: 2023-08-28

## 2023-08-28 RX ORDER — KETOROLAC TROMETHAMINE 30 MG/ML
30 INJECTION, SOLUTION INTRAMUSCULAR; INTRAVENOUS EVERY 6 HOURS
Status: DISPENSED | OUTPATIENT
Start: 2023-08-28 | End: 2023-08-29

## 2023-08-28 RX ORDER — MISOPROSTOL 100 UG/1
800 TABLET ORAL PRN
Status: DISCONTINUED | OUTPATIENT
Start: 2023-08-28 | End: 2023-08-30 | Stop reason: HOSPADM

## 2023-08-28 RX ORDER — ONDANSETRON 2 MG/ML
4 INJECTION INTRAMUSCULAR; INTRAVENOUS EVERY 6 HOURS PRN
Status: DISCONTINUED | OUTPATIENT
Start: 2023-08-28 | End: 2023-08-30 | Stop reason: HOSPADM

## 2023-08-28 RX ORDER — MORPHINE SULFATE 0.5 MG/ML
INJECTION, SOLUTION EPIDURAL; INTRATHECAL; INTRAVENOUS PRN
Status: DISCONTINUED | OUTPATIENT
Start: 2023-08-28 | End: 2023-08-28 | Stop reason: SDUPTHER

## 2023-08-28 RX ORDER — PRENATAL WITH FERROUS FUM AND FOLIC ACID 3080; 920; 120; 400; 22; 1.84; 3; 20; 10; 1; 12; 200; 27; 25; 2 [IU]/1; [IU]/1; MG/1; [IU]/1; MG/1; MG/1; MG/1; MG/1; MG/1; MG/1; UG/1; MG/1; MG/1; MG/1; MG/1
1 TABLET ORAL DAILY
Status: DISCONTINUED | OUTPATIENT
Start: 2023-08-28 | End: 2023-08-30 | Stop reason: HOSPADM

## 2023-08-28 RX ORDER — SODIUM CHLORIDE, SODIUM LACTATE, POTASSIUM CHLORIDE, CALCIUM CHLORIDE 600; 310; 30; 20 MG/100ML; MG/100ML; MG/100ML; MG/100ML
INJECTION, SOLUTION INTRAVENOUS CONTINUOUS
Status: DISCONTINUED | OUTPATIENT
Start: 2023-08-28 | End: 2023-08-28

## 2023-08-28 RX ORDER — NALOXONE HYDROCHLORIDE 1 MG/ML
0.4 INJECTION INTRAMUSCULAR; INTRAVENOUS; SUBCUTANEOUS PRN
Status: DISCONTINUED | OUTPATIENT
Start: 2023-08-28 | End: 2023-08-30 | Stop reason: HOSPADM

## 2023-08-28 RX ORDER — CEFAZOLIN SODIUM IN 0.9 % NACL 2 G/100 ML
2000 PLASTIC BAG, INJECTION (ML) INTRAVENOUS ONCE
Status: COMPLETED | OUTPATIENT
Start: 2023-08-28 | End: 2023-08-28

## 2023-08-28 RX ORDER — METHYLERGONOVINE MALEATE 0.2 MG/ML
200 INJECTION INTRAVENOUS PRN
Status: DISCONTINUED | OUTPATIENT
Start: 2023-08-28 | End: 2023-08-30 | Stop reason: HOSPADM

## 2023-08-28 RX ORDER — IBUPROFEN 600 MG/1
600 TABLET ORAL EVERY 8 HOURS
Status: DISCONTINUED | OUTPATIENT
Start: 2023-08-29 | End: 2023-08-30 | Stop reason: HOSPADM

## 2023-08-28 RX ORDER — SODIUM CHLORIDE, SODIUM LACTATE, POTASSIUM CHLORIDE, AND CALCIUM CHLORIDE .6; .31; .03; .02 G/100ML; G/100ML; G/100ML; G/100ML
1000 INJECTION, SOLUTION INTRAVENOUS ONCE
Status: DISCONTINUED | OUTPATIENT
Start: 2023-08-28 | End: 2023-08-28

## 2023-08-28 RX ORDER — SODIUM CHLORIDE 0.9 % (FLUSH) 0.9 %
10 SYRINGE (ML) INJECTION EVERY 12 HOURS SCHEDULED
Status: DISCONTINUED | OUTPATIENT
Start: 2023-08-28 | End: 2023-08-28

## 2023-08-28 RX ORDER — DOCUSATE SODIUM 100 MG/1
100 CAPSULE, LIQUID FILLED ORAL 2 TIMES DAILY
Status: DISCONTINUED | OUTPATIENT
Start: 2023-08-28 | End: 2023-08-30 | Stop reason: HOSPADM

## 2023-08-28 RX ORDER — FAMOTIDINE 20 MG/1
20 TABLET, FILM COATED ORAL 2 TIMES DAILY
Status: DISCONTINUED | OUTPATIENT
Start: 2023-08-28 | End: 2023-08-30 | Stop reason: HOSPADM

## 2023-08-28 RX ORDER — BUPIVACAINE HYDROCHLORIDE 7.5 MG/ML
INJECTION, SOLUTION INTRASPINAL PRN
Status: DISCONTINUED | OUTPATIENT
Start: 2023-08-28 | End: 2023-08-28 | Stop reason: SDUPTHER

## 2023-08-28 RX ORDER — OXYTOCIN 10 [USP'U]/ML
INJECTION, SOLUTION INTRAMUSCULAR; INTRAVENOUS PRN
Status: DISCONTINUED | OUTPATIENT
Start: 2023-08-28 | End: 2023-08-28 | Stop reason: SDUPTHER

## 2023-08-28 RX ORDER — FERROUS SULFATE 325(65) MG
325 TABLET ORAL 2 TIMES DAILY WITH MEALS
Status: DISCONTINUED | OUTPATIENT
Start: 2023-08-28 | End: 2023-08-30 | Stop reason: HOSPADM

## 2023-08-28 RX ORDER — OXYCODONE HYDROCHLORIDE 5 MG/1
10 TABLET ORAL EVERY 4 HOURS PRN
Status: DISCONTINUED | OUTPATIENT
Start: 2023-08-28 | End: 2023-08-30 | Stop reason: HOSPADM

## 2023-08-28 RX ORDER — SODIUM CHLORIDE, SODIUM LACTATE, POTASSIUM CHLORIDE, CALCIUM CHLORIDE 600; 310; 30; 20 MG/100ML; MG/100ML; MG/100ML; MG/100ML
INJECTION, SOLUTION INTRAVENOUS CONTINUOUS PRN
Status: DISCONTINUED | OUTPATIENT
Start: 2023-08-28 | End: 2023-08-28 | Stop reason: SDUPTHER

## 2023-08-28 RX ORDER — ACETAMINOPHEN 500 MG
1000 TABLET ORAL EVERY 8 HOURS
Status: DISCONTINUED | OUTPATIENT
Start: 2023-08-28 | End: 2023-08-30 | Stop reason: HOSPADM

## 2023-08-28 RX ORDER — SODIUM CHLORIDE, SODIUM LACTATE, POTASSIUM CHLORIDE, CALCIUM CHLORIDE 600; 310; 30; 20 MG/100ML; MG/100ML; MG/100ML; MG/100ML
INJECTION, SOLUTION INTRAVENOUS CONTINUOUS
Status: DISCONTINUED | OUTPATIENT
Start: 2023-08-28 | End: 2023-08-30 | Stop reason: HOSPADM

## 2023-08-28 RX ORDER — ONDANSETRON 8 MG/1
8 TABLET, ORALLY DISINTEGRATING ORAL EVERY 8 HOURS PRN
Status: DISCONTINUED | OUTPATIENT
Start: 2023-08-28 | End: 2023-08-30 | Stop reason: HOSPADM

## 2023-08-28 RX ADMIN — ONDANSETRON 4 MG: 2 INJECTION INTRAMUSCULAR; INTRAVENOUS at 18:40

## 2023-08-28 RX ADMIN — OXYTOCIN 10 UNITS: 10 INJECTION INTRAVENOUS at 16:42

## 2023-08-28 RX ADMIN — SODIUM CHLORIDE, SODIUM LACTATE, POTASSIUM CHLORIDE, AND CALCIUM CHLORIDE: .6; .31; .03; .02 INJECTION, SOLUTION INTRAVENOUS at 15:43

## 2023-08-28 RX ADMIN — PROMETHAZINE HYDROCHLORIDE 25 MG: 25 INJECTION INTRAMUSCULAR; INTRAVENOUS at 21:31

## 2023-08-28 RX ADMIN — SODIUM CHLORIDE, PRESERVATIVE FREE 10 ML: 5 INJECTION INTRAVENOUS at 21:32

## 2023-08-28 RX ADMIN — Medication 2000 MG: at 15:51

## 2023-08-28 RX ADMIN — OXYTOCIN 20 UNITS: 10 INJECTION INTRAVENOUS at 16:18

## 2023-08-28 RX ADMIN — BUPIVACAINE HYDROCHLORIDE 1.6 ML: 7.5 INJECTION, SOLUTION SUBARACHNOID at 15:49

## 2023-08-28 RX ADMIN — SODIUM CHLORIDE, SODIUM LACTATE, POTASSIUM CHLORIDE, AND CALCIUM CHLORIDE: .6; .31; .03; .02 INJECTION, SOLUTION INTRAVENOUS at 16:42

## 2023-08-28 RX ADMIN — MORPHINE SULFATE 0.2 MG: 0.5 INJECTION EPIDURAL; INTRATHECAL; INTRAVENOUS at 15:49

## 2023-08-28 RX ADMIN — SODIUM CHLORIDE, POTASSIUM CHLORIDE, SODIUM LACTATE AND CALCIUM CHLORIDE: 600; 310; 30; 20 INJECTION, SOLUTION INTRAVENOUS at 17:12

## 2023-08-28 ASSESSMENT — LIFESTYLE VARIABLES: SMOKING_STATUS: 1

## 2023-08-28 NOTE — ANESTHESIA PRE PROCEDURE
CO2 28 09/28/2018 01:33 PM    BUN 14 09/28/2018 01:33 PM    CREATININE <0.5 09/28/2018 01:33 PM    GFRAA >60 09/28/2018 01:33 PM    AGRATIO 1.6 09/28/2018 01:33 PM    LABGLOM >60 09/28/2018 01:33 PM    GLUCOSE 102 09/28/2018 01:33 PM    PROT 7.4 09/28/2018 01:33 PM    CALCIUM 9.7 09/28/2018 01:33 PM    BILITOT 0.4 09/28/2018 01:33 PM    ALKPHOS 101 09/28/2018 01:33 PM    AST 17 09/28/2018 01:33 PM    ALT 25 09/28/2018 01:33 PM       POC Tests: No results for input(s): POCGLU, POCNA, POCK, POCCL, POCBUN, POCHEMO, POCHCT in the last 72 hours. Coags:   Lab Results   Component Value Date/Time    PROTIME 9.5 07/10/2016 01:48 AM    INR 0.84 07/10/2016 01:48 AM    APTT 28.0 07/10/2016 01:48 AM       HCG (If Applicable):   Lab Results   Component Value Date    PREGTESTUR Negative 09/28/2018        ABGs: No results found for: PHART, PO2ART, LJN5UBQ, SCX3WHN, BEART, I1NHIILO     Type & Screen (If Applicable):  No results found for: LABABO, LABRH    Drug/Infectious Status (If Applicable):  No results found for: HIV, HEPCAB    COVID-19 Screening (If Applicable): No results found for: COVID19        Anesthesia Evaluation   no history of anesthetic complications:   Airway: Mallampati: II  TM distance: >3 FB   Neck ROM: full  Mouth opening: > = 3 FB   Dental:          Pulmonary:normal exam    (+) current smoker (marijuana, vapes)                           Cardiovascular:    (+) hypertension (Pre-eclampsia):,                   Neuro/Psych:   Negative Neuro/Psych ROS              GI/Hepatic/Renal:   (+) hepatitis: C,           Endo/Other: Negative Endo/Other ROS                    Abdominal:             Vascular: negative vascular ROS. Other Findings:           Anesthesia Plan      spinal     ASA 2             Anesthetic plan and risks discussed with patient and spouse. Plan discussed with attending.                     CRISTÓBAL Simpson - JAMAL   8/28/2023

## 2023-08-28 NOTE — ANESTHESIA PROCEDURE NOTES
Spinal Block    Patient location during procedure: OR  End time: 8/28/2023 3:49 PM  Reason for block: primary anesthetic  Staffing  Performed: resident/CRNA   Resident/CRNA: CRISTÓBAL Mccoy - CRNA  Spinal Block  Patient position: sitting  Prep: ChloraPrep  Patient monitoring: cardiac monitor, continuous pulse ox and frequent blood pressure checks  Approach: midline  Location: L3/L4  Provider prep: mask  Local infiltration: lidocaine  Needle  Needle type: Pencan   Needle gauge: 24 G  Needle length: 4 in  Assessment  Sensory level: T6  Swirl obtained: Yes  CSF: clear  Attempts: 1  Hemodynamics: stable  Additional Notes  Pt.  Sitting, sterile prep/drape, 1%Lido @ L3-4, 24ga pencil point needle inserted via introducer, positive clear, free flowing CSF x 4 quad, 1.6ml 0.75% spinal marcaine and duramorph 0.2mg intrathecally   Preanesthetic Checklist  Completed: patient identified, IV checked, site marked, risks and benefits discussed, surgical/procedural consents, equipment checked, pre-op evaluation, timeout performed, anesthesia consent given, oxygen available and monitors applied/VS acknowledged

## 2023-08-28 NOTE — L&D DELIVERY SUMMARY NOTE
breech presentation without difficulty. APGAR scores were 9 at one minute and 9 at five minutes. Delayed cord clamping was performed for 1 minute. After the umbilical cord was clamped and cut, cord blood was obtained for evaluation. The placenta was removed intact and appeared normal. The uterine outline, tubes and ovaries appeared normal. Paratubal cyst noted on right fallopian tube. The uterine incision was closed with running locked sutures of 0 Vicryl. A second 0 Vicryl was used to imbricate the hysterotomy closure. Two figure of eight sutures using 0 Vicryl  were used for hemostasis. The Richie retractor was removed. The hysterotomy closure was re inspected and hemostasis was observed. The peritoneum was closed with 2-0 Vicryl. The fascia was then reapproximated with running sutures of 0 Vicryl. Bovie cautery was used on subcutaneous tissue for hemostasis. The subcutaneous tissue was re approximated using 3-0 Vicryl. The skin was reapproximated with 3-0 Monocryl. Skin glue was placed over incision. Instrument, sponge, and needle counts were correct prior the abdominal closure and at the conclusion of the case. Findings:    Rectus fascia adhesions. Omental adhesions to peritoneum and anterior uterus. Right paratubal cyst. Rest of uterus, bilateral fallopian tubes and ovaries appeared normal and without adhesions. Intake/Output:     Date 23 07 - 23 07(Not Admitted) 23 0701 - 23 0700   Shift 7248-7572 0508-0236 24 Hour Total 5587-2655 7727-3832 24 Hour Total   INTAKE   I.V.    1200  1200   IV Piggyback    100  100   Shift Total    1300  1300   OUTPUT   Blood    287  287     Quantitative Blood Loss (mL)    287  287   Shift Total    287  287   NET    1013  1013            Drains: sloan, 150 cc, clear urine noted at end of procedure                Specimens: cord blood           Implants: none           Complications:  none         Disposition: PACU - hemodynamically stable.

## 2023-08-28 NOTE — H&P
Department of Obstetrics and Gynecology  Attending Obstetrics History and Physical        CHIEF COMPLAINT:  scheduled repeat  section    HISTORY OF PRESENT ILLNESS:      The patient is a 33 y.o. y/o    @  39w2d weeks by LMP confirmed with 10w1d US. Patient presents to L&D for scheduled RLTCS. Denies any changes in her medical hx since being seen. Denies HA/vision changes/chest pain/SOB/N/V/LOF/VB. Reports good fetal movement. 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), tobacco use (vaping), MJ use (positive on admission), breast fibroadenoma (bx ), bilateral pyelectasis (resolved 32 wk)    Prenatal labs:  Blood type: A positive  GBS: negative 23  RPR: NR  HCV: positive, quant 1950 IU/mL 23  HBsAg: negative  HIV: NR  Rubella: immune  STI vaginal swab: negative 23    Past Medical History:        Diagnosis Date    Drug abuse ([address])     Fibromatosis     Hepatitis C     Preeclampsia      Past Surgical History:        Procedure Laterality Date     SECTION      DILATION AND CURETTAGE OF UTERUS N/A 7/15/2022    SUCTION DILATATION AND CURETTAGE performed by Omar Fishman MD at [address] [facility] RIGHT Right 2022     BREAST NEEDLE BIOPSY RIGHT 2022 [address] [facility]    US BREAST BIOPSY W LOC DEVICE 1ST LESION RIGHT Right 5/3/2023     BREAST BIOPSY W LOC DEVICE 1ST LESION RIGHT 5/3/2023 Shannan Hernandez MD [address] [place] [address]     Social History:    TOBACCO:   reports that she quit smoking about 3 years ago. Her smoking use included cigarettes and e-cigarettes. She smoked an average of .5 packs per day. She has never used smokeless tobacco.  ETOH:   reports current alcohol use. DRUGS:   reports current drug use. Frequency: 7.00 times per week. Drug: Marijuana Dorcus Sjogren).   MARITAL STATUS:    Family History:       Problem Relation Age of Onset

## 2023-08-29 LAB
ALBUMIN SERPL-MCNC: 2.5 G/DL (ref 3.4–5)
ALBUMIN/GLOB SERPL: 0.9 {RATIO} (ref 1.1–2.2)
ALP SERPL-CCNC: 106 U/L (ref 40–129)
ALT SERPL-CCNC: 13 U/L (ref 10–40)
ANION GAP SERPL CALCULATED.3IONS-SCNC: 7 MMOL/L (ref 3–16)
AST SERPL-CCNC: 29 U/L (ref 15–37)
BASOPHILS # BLD: 0.1 K/UL (ref 0–0.2)
BASOPHILS NFR BLD: 0.4 %
BILIRUB SERPL-MCNC: 0.5 MG/DL (ref 0–1)
BUN SERPL-MCNC: 4 MG/DL (ref 7–20)
CALCIUM SERPL-MCNC: 8.6 MG/DL (ref 8.3–10.6)
CHLORIDE SERPL-SCNC: 99 MMOL/L (ref 99–110)
CO2 SERPL-SCNC: 26 MMOL/L (ref 21–32)
CREAT SERPL-MCNC: <0.5 MG/DL (ref 0.6–1.1)
DEPRECATED RDW RBC AUTO: 13.1 % (ref 12.4–15.4)
EOSINOPHIL # BLD: 0.1 K/UL (ref 0–0.6)
EOSINOPHIL NFR BLD: 0.7 %
GFR SERPLBLD CREATININE-BSD FMLA CKD-EPI: >60 ML/MIN/{1.73_M2}
GLUCOSE SERPL-MCNC: 70 MG/DL (ref 70–99)
HCT VFR BLD AUTO: 31 % (ref 36–48)
HGB BLD-MCNC: 10.9 G/DL (ref 12–16)
LYMPHOCYTES # BLD: 2.8 K/UL (ref 1–5.1)
LYMPHOCYTES NFR BLD: 20.5 %
MCH RBC QN AUTO: 31.5 PG (ref 26–34)
MCHC RBC AUTO-ENTMCNC: 35.2 G/DL (ref 31–36)
MCV RBC AUTO: 89.6 FL (ref 80–100)
MONOCYTES # BLD: 0.9 K/UL (ref 0–1.3)
MONOCYTES NFR BLD: 6.8 %
NEUTROPHILS # BLD: 9.9 K/UL (ref 1.7–7.7)
NEUTROPHILS NFR BLD: 71.6 %
PLATELET # BLD AUTO: 307 K/UL (ref 135–450)
PMV BLD AUTO: 8.6 FL (ref 5–10.5)
POTASSIUM SERPL-SCNC: 3.7 MMOL/L (ref 3.5–5.1)
PROT SERPL-MCNC: 5.3 G/DL (ref 6.4–8.2)
RBC # BLD AUTO: 3.46 M/UL (ref 4–5.2)
RPR SER QL: NORMAL
SODIUM SERPL-SCNC: 132 MMOL/L (ref 136–145)
WBC # BLD AUTO: 13.8 K/UL (ref 4–11)

## 2023-08-29 PROCEDURE — 85025 COMPLETE CBC W/AUTO DIFF WBC: CPT

## 2023-08-29 PROCEDURE — 1220000000 HC SEMI PRIVATE OB R&B

## 2023-08-29 PROCEDURE — 80053 COMPREHEN METABOLIC PANEL: CPT

## 2023-08-29 PROCEDURE — 6370000000 HC RX 637 (ALT 250 FOR IP): Performed by: STUDENT IN AN ORGANIZED HEALTH CARE EDUCATION/TRAINING PROGRAM

## 2023-08-29 PROCEDURE — 2580000003 HC RX 258: Performed by: STUDENT IN AN ORGANIZED HEALTH CARE EDUCATION/TRAINING PROGRAM

## 2023-08-29 PROCEDURE — 36415 COLL VENOUS BLD VENIPUNCTURE: CPT

## 2023-08-29 PROCEDURE — 6360000002 HC RX W HCPCS: Performed by: STUDENT IN AN ORGANIZED HEALTH CARE EDUCATION/TRAINING PROGRAM

## 2023-08-29 PROCEDURE — 6370000000 HC RX 637 (ALT 250 FOR IP): Performed by: OBSTETRICS & GYNECOLOGY

## 2023-08-29 RX ORDER — NICOTINE 21 MG/24HR
1 PATCH, TRANSDERMAL 24 HOURS TRANSDERMAL DAILY
Status: DISCONTINUED | OUTPATIENT
Start: 2023-08-29 | End: 2023-08-30 | Stop reason: HOSPADM

## 2023-08-29 RX ADMIN — ACETAMINOPHEN 1000 MG: 500 TABLET ORAL at 12:22

## 2023-08-29 RX ADMIN — ACETAMINOPHEN 1000 MG: 500 TABLET ORAL at 20:54

## 2023-08-29 RX ADMIN — KETOROLAC TROMETHAMINE 30 MG: 30 INJECTION, SOLUTION INTRAMUSCULAR at 02:03

## 2023-08-29 RX ADMIN — SODIUM CHLORIDE, PRESERVATIVE FREE 10 ML: 5 INJECTION INTRAVENOUS at 08:16

## 2023-08-29 RX ADMIN — DOCUSATE SODIUM 100 MG: 100 CAPSULE, LIQUID FILLED ORAL at 08:18

## 2023-08-29 RX ADMIN — KETOROLAC TROMETHAMINE 30 MG: 30 INJECTION, SOLUTION INTRAMUSCULAR at 08:11

## 2023-08-29 RX ADMIN — DOCUSATE SODIUM 100 MG: 100 CAPSULE, LIQUID FILLED ORAL at 20:54

## 2023-08-29 RX ADMIN — IBUPROFEN 600 MG: 600 TABLET ORAL at 18:07

## 2023-08-29 ASSESSMENT — PAIN SCALES - GENERAL
PAINLEVEL_OUTOF10: 3
PAINLEVEL_OUTOF10: 1
PAINLEVEL_OUTOF10: 1

## 2023-08-29 ASSESSMENT — PAIN - FUNCTIONAL ASSESSMENT
PAIN_FUNCTIONAL_ASSESSMENT: ACTIVITIES ARE NOT PREVENTED

## 2023-08-29 ASSESSMENT — PAIN DESCRIPTION - DESCRIPTORS
DESCRIPTORS: SORE
DESCRIPTORS: ACHING
DESCRIPTORS: SORE

## 2023-08-29 ASSESSMENT — PAIN DESCRIPTION - LOCATION
LOCATION: ABDOMEN;INCISION
LOCATION: INCISION
LOCATION: INCISION

## 2023-08-29 NOTE — ANESTHESIA POSTPROCEDURE EVALUATION
Department of Anesthesiology  Postprocedure Note    Patient: Toyin Clement  MRN: [id number]  YOB: 1995  Date of evaluation: 2023      Procedure Summary     Date: 23 Room / Location: [facility] L&D OR [address] [place] [address]    Anesthesia Start:  Anesthesia Stop: 7640    Procedure:  SECTION (Abdomen) Diagnosis:       Previous  section      (Previous  section [C52.117])    Surgeons: Domenica Ortega DO Responsible Provider: Elizabeth Alexandre MD    Anesthesia Type: spinal ASA Status: 2          Anesthesia Type: No value filed.     Patricia Phase I: Patricia Score: 9    Patricia Phase II: Patricia Score: 9      Anesthesia Post Evaluation    Patient location during evaluation: PACU  Level of consciousness: awake  Nausea & Vomiting: no vomiting  Complications: no  Cardiovascular status: blood pressure returned to baseline  Respiratory status: acceptable  Hydration status: stable

## 2023-08-29 NOTE — CARE COORDINATION
Social Work Consult/Assessment    Reason for Consult: + thc on admission; hx meth/heroin  Electronic record reviewed:yes   Delivery information:baby girl \" Linden Cutting" 2023 39w2d CS-Ltranv Weight 7 lbs 2.6 oz Apgar 9,9   Marital Status:Single    Mob's UDS on admission:+ THC   Infant's UDS/Cord tox:UDS negative, cord pending      Spoke with Mob today explained SW services. Present in the room:MOB, FOB, baby   Living situation:MOB, FOB, baby, Dtr   Address and phone: [address] [address] [facility] [address] [facility] [phone]  Spouse or significant other:Abraham Haro 1995  Children's Protective Services involvement: Denies prior  Support system:good family supports denies needs   Domestic Violence:denies   Mental Health:reports dx anxiety- previously on medication, not currently. MOB feels mental health is managed, denies intervention needed   Post Partum Depression:reviewed s/sx   Substance Abuse:Reports hx meth/heroin- inpatient rehab at the [place], transitioned  to [facility]- since graduated. MOB confident in sobriety , denies need for intervention. MOB does report have medical [facility] card number [id number] exp 2024 re Hep c only uses about weekly, plans to bottle feed. Social Assistance Programs: [address] [facility] has apt for 2023 [employer] [place] denies   Medicaid [place] has all needed supplies   Every Child Succeeds:reviewed, accepted info declined need      Summary:Plan is for baby to discharge home with MOB when medically stable for discharge. MOB UDS +/baby UDS - for [facility] has medical card, SW will follow for Cord results and report accordingly. [place] with no barrier to dc. MILKA Loja

## 2023-08-30 VITALS
TEMPERATURE: 97.9 F | DIASTOLIC BLOOD PRESSURE: 72 MMHG | SYSTOLIC BLOOD PRESSURE: 115 MMHG | WEIGHT: 163 LBS | BODY MASS INDEX: 30.78 KG/M2 | RESPIRATION RATE: 16 BRPM | HEART RATE: 62 BPM | OXYGEN SATURATION: 96 % | HEIGHT: 61 IN

## 2023-08-30 PROCEDURE — 6370000000 HC RX 637 (ALT 250 FOR IP): Performed by: STUDENT IN AN ORGANIZED HEALTH CARE EDUCATION/TRAINING PROGRAM

## 2023-08-30 PROCEDURE — 2580000003 HC RX 258: Performed by: STUDENT IN AN ORGANIZED HEALTH CARE EDUCATION/TRAINING PROGRAM

## 2023-08-30 RX ORDER — FERROUS SULFATE 325(65) MG
325 TABLET ORAL
Qty: 30 TABLET | Refills: 0 | Status: SHIPPED | OUTPATIENT
Start: 2023-08-30

## 2023-08-30 RX ORDER — IBUPROFEN 800 MG/1
800 TABLET ORAL
Qty: 30 TABLET | Refills: 0 | Status: SHIPPED | OUTPATIENT
Start: 2023-08-30

## 2023-08-30 RX ORDER — ACETAMINOPHEN 500 MG
1000 TABLET ORAL 4 TIMES DAILY PRN
Qty: 80 TABLET | Refills: 0 | Status: SHIPPED | OUTPATIENT
Start: 2023-08-30

## 2023-08-30 RX ORDER — PSEUDOEPHEDRINE HCL 30 MG
100 TABLET ORAL 2 TIMES DAILY
Qty: 60 CAPSULE | Refills: 0 | Status: SHIPPED | OUTPATIENT
Start: 2023-08-30

## 2023-08-30 RX ADMIN — SODIUM CHLORIDE, PRESERVATIVE FREE 10 ML: 5 INJECTION INTRAVENOUS at 00:45

## 2023-08-30 RX ADMIN — ACETAMINOPHEN 1000 MG: 500 TABLET ORAL at 07:02

## 2023-08-30 RX ADMIN — IBUPROFEN 600 MG: 600 TABLET ORAL at 12:34

## 2023-08-30 RX ADMIN — DOCUSATE SODIUM 100 MG: 100 CAPSULE, LIQUID FILLED ORAL at 09:09

## 2023-08-30 RX ADMIN — IBUPROFEN 600 MG: 600 TABLET ORAL at 03:56

## 2023-08-30 RX ADMIN — FAMOTIDINE 20 MG: 20 TABLET, FILM COATED ORAL at 09:09

## 2023-08-30 RX ADMIN — METFORMIN HYDROCHLORIDE 1 TABLET: 500 TABLET, EXTENDED RELEASE ORAL at 09:09

## 2023-08-30 ASSESSMENT — PAIN DESCRIPTION - LOCATION: LOCATION: ABDOMEN

## 2023-08-30 ASSESSMENT — PAIN - FUNCTIONAL ASSESSMENT: PAIN_FUNCTIONAL_ASSESSMENT: ACTIVITIES ARE NOT PREVENTED

## 2023-08-30 ASSESSMENT — PAIN DESCRIPTION - DESCRIPTORS: DESCRIPTORS: DISCOMFORT

## 2023-08-30 ASSESSMENT — PAIN SCALES - GENERAL: PAINLEVEL_OUTOF10: 4

## 2023-08-30 ASSESSMENT — PAIN DESCRIPTION - ORIENTATION: ORIENTATION: LOWER

## 2023-08-30 NOTE — DISCHARGE INSTRUCTIONS
Section: What to Expect at [address] [place] [address]     A  section, or , is surgery to deliver your baby through a cut that the doctor makes in your lower belly and uterus. The cut is called an incision. You may have some pain in your lower belly and need pain medicine for 1 to 2 weeks. You can expect some vaginal bleeding for several weeks. You will probably need about 6 weeks to fully recover. It's important to take it easy while the incision heals. Avoid heavy lifting, strenuous activities, and exercises that strain the belly muscles while you recover. Ask a family member or friend for help with housework, cooking, and shopping. This care sheet gives you a general idea about how long it will take for you to recover. But each person recovers at a different pace. Follow the steps below to get better as quickly as possible. How can you care for yourself at home? Activity    Rest when you feel tired. Getting enough sleep will help you recover. Try to walk each day. Start by walking a little more than you did the day before. Bit by bit, increase the amount you walk. Walking boosts blood flow and helps prevent pneumonia, constipation, and blood clots. Avoid strenuous activities, such as bicycle riding, jogging, weightlifting, and aerobic exercise, for 6 weeks or until your doctor says it is okay. Until your doctor says it is okay, do not lift anything heavier than your baby. Do not do sit-ups or other exercises that strain the belly muscles for 6 weeks or until your doctor says it is okay. Hold a pillow over your incision when you cough or take deep breaths. This will support your belly and decrease your pain. You may shower as usual. Pat the incision dry when you are done. You will have some vaginal bleeding. Wear sanitary pads. Do not douche or use tampons until your doctor says it is okay. Ask your doctor when you can drive again.      You will probably

## 2023-08-30 NOTE — PLAN OF CARE
Problem: Postpartum  Goal: Experiences normal postpartum course  Description:  Postpartum OB-Pregnancy care plan goal which identifies if the mother is experiencing a normal postpartum course  2023 09 by Savana Pizarro RN  Outcome: Progressing  2023 by Vivian Washburn RN  Outcome: Progressing  Goal: Appropriate maternal -  bonding  Description:  Postpartum OB-Pregnancy care plan goal which identifies if the mother and  are bonding appropriately  2023 09 by Savana Pizarro RN  Outcome: Progressing  2023 by Vivian Washburn RN  Outcome: Progressing  Goal: Establishment of infant feeding pattern  Description:  Postpartum OB-Pregnancy care plan goal which identifies if the mother is establishing a feeding pattern with their   2023 [id number] by Savana Pizarro RN  Outcome: Progressing  2023 by Vivian Washburn RN  Outcome: Progressing  Goal: Incisions, wounds, or drain sites healing without S/S of infection  2023 by Savana Pizarro RN  Outcome: Progressing  2023 by Vivian Washburn RN  Outcome: Progressing     Problem: Pain  Goal: Verbalizes/displays adequate comfort level or baseline comfort level  2023 by Savana Pizarro RN  Outcome: Progressing  2023 by Vivian Washburn RN  Outcome: Progressing     Problem: Infection - Adult  Goal: Absence of infection at discharge  2023 09 by Savana Pizarro RN  Outcome: Progressing  2023 by Vivian Washburn RN  Outcome: Progressing  Goal: Absence of infection during hospitalization  2023 09 by Savana Pizarro RN  Outcome: Progressing  2023 by Vivian Washburn RN  Outcome: Progressing  Goal: Absence of fever/infection during anticipated neutropenic period  2023 [id number] by Savana Pizarro RN  Outcome: Progressing  2023 by Vivian Washburn RN  Outcome: Progressing     Problem: Safety - Adult  Goal:

## 2023-08-30 NOTE — PLAN OF CARE
Problem: Postpartum  Goal: Experiences normal postpartum course  Description:  Postpartum OB-Pregnancy care plan goal which identifies if the mother is experiencing a normal postpartum course  2023 1237 by Eugenie Bermudez RN  Outcome: Completed  2023 by Deena Carlisle RN  Outcome: Progressing  Goal: Appropriate maternal -  bonding  Description:  Postpartum OB-Pregnancy care plan goal which identifies if the mother and  are bonding appropriately  2023 1237 by Eugenie Bermudez RN  Outcome: Completed  2023 by Deena Carlisle RN  Outcome: Progressing  Goal: Establishment of infant feeding pattern  Description:  Postpartum OB-Pregnancy care plan goal which identifies if the mother is establishing a feeding pattern with their   2023 1237 by Eugenie Bermudez RN  Outcome: Completed  2023 by Deena Carlisle RN  Outcome: Progressing  Goal: Incisions, wounds, or drain sites healing without S/S of infection  2023 1237 by Eugenie Bermudez RN  Outcome: Completed  2023 by Deena Carlisle RN  Outcome: Progressing     Problem: Pain  Goal: Verbalizes/displays adequate comfort level or baseline comfort level  2023 1237 by Eugenie Bermudez RN  Outcome: Completed  2023 by Deena Carlisle RN  Outcome: Progressing     Problem: Infection - Adult  Goal: Absence of infection at discharge  2023 123 by Eugenie Bermudez RN  Outcome: Completed  2023 by Deena Carlisle RN  Outcome: Progressing  Goal: Absence of infection during hospitalization  2023 1237 by Eugenie Bermudez RN  Outcome: Completed  2023 by Deena Carlisle RN  Outcome: Progressing  Goal: Absence of fever/infection during anticipated neutropenic period  2023 1237 by Eugenie Bermudez RN  Outcome: Completed  2023 by Deena Carlisle RN  Outcome: Progressing     Problem: Safety - Adult  Goal: Free from fall injury  2023 by Eugenie Bermudez RN  Outcome:

## 2023-08-30 NOTE — FLOWSHEET NOTE
This RN reviewed all documentation and supervised all care done by [facility] RN  Christopher Muro RN

## 2023-08-30 NOTE — PLAN OF CARE
Problem: Postpartum  Goal: Experiences normal postpartum course  Description:  Postpartum OB-Pregnancy care plan goal which identifies if the mother is experiencing a normal postpartum course  2023 by Sohan Mchugh RN  Outcome: Progressing  2023 by Alicia Stevens RN  Outcome: Progressing  Goal: Appropriate maternal -  bonding  Description:  Postpartum OB-Pregnancy care plan goal which identifies if the mother and  are bonding appropriately  2023 by Sohan Mchugh RN  Outcome: Progressing  2023 by Alicia Stevens RN  Outcome: Progressing  Goal: Establishment of infant feeding pattern  Description:  Postpartum OB-Pregnancy care plan goal which identifies if the mother is establishing a feeding pattern with their   2023 by Sohan Mchugh RN  Outcome: Progressing  2023 by Alicia Stevens RN  Outcome: Progressing  Goal: Incisions, wounds, or drain sites healing without S/S of infection  2023 by Sohan Mchugh RN  Outcome: Progressing  2023 by Alicia Stevens RN  Outcome: Progressing     Problem: Pain  Goal: Verbalizes/displays adequate comfort level or baseline comfort level  2023 by Sohan Mchugh RN  Outcome: Progressing  2023 by Alicia Stevens RN  Outcome: Progressing     Problem: Infection - Adult  Goal: Absence of infection at discharge  2023 by Sohan Mchugh RN  Outcome: Progressing  2023 by Alicia Stevens RN  Outcome: Progressing  Goal: Absence of infection during hospitalization  2023 by Sohan Mchugh RN  Outcome: Progressing  2023 07 by Alicia Stevens RN  Outcome: Progressing  Goal: Absence of fever/infection during anticipated neutropenic period  2023 by Sohan Mchugh RN  Outcome: Progressing  2023 by Alicia Stevens RN  Outcome: Progressing     Problem: Safety - Adult  Goal: Free from fall

## 2023-08-30 NOTE — FLOWSHEET NOTE
Discharge Phone Call    Patient Name: Livier Larsen     [facility] Care Provider: Radha Sánchez DO Discharge Date: 2023    Disposition of baby:    Phone Number: [phone] (home)     Attempts to Contact:  Date:    Caller  Date:    Caller  Date:    Caller    Information for the patient's :  Philip Dobson [address] [place] [address] [[id number]]   Delivery Method: , Low Transverse     1. Now that you are at home is your pain being well controlled? Y/N   If no, instruct to call       provider. 2. Are you breastfeeding? Y/N    Do you need any extra support from our lactation staff? Y/N    If yes, provide number for lactation. 3. Have you made or already had your first appointment with the baby's doctor? Y/N   If no, do      you know when to schedule it? Y/N    4. Have you scheduled your follow-up appointment? Y/N  If no, do you know when to schedule       it? Y/N   If no, they can find it on printed discharge instructions. 5. Did staff discuss safe sleep during your stay? Y/N   6. Did we explain things in a way you could understand? Y/N  7. Were we respectful of your preferences for labor and birth and include you in the plan of       care? Y/N  If no, please explain _______________________________________________  8. Is there anyone in particular you would like to mention who provided care for you? _______      _________________________________________________________________________     9. Were you given a Post-Birth Warning Signs handout? Y/N  Do you have it somewhere      easily accessible? Y/N  If no, please send them a copy and ask them to put it somewhere      easily found. 10. Have you been crying excessively, having anger or mood swings that feel out of control, or       feel like you can't cope with caring for yourself or baby? Y/N   If yes, they may be showing       signs of postpartum depression and should call provider.  There is also a        depression

## 2024-05-06 ENCOUNTER — HOSPITAL ENCOUNTER (OUTPATIENT)
Dept: WOMENS IMAGING | Age: 29
Discharge: HOME | End: 2024-05-06
Payer: COMMERCIAL

## 2024-05-06 DIAGNOSIS — D24.9 FIBROADENOMA OF BREAST, UNSPECIFIED LATERALITY: ICD-10-CM

## 2024-05-06 PROCEDURE — 76642 ULTRASOUND BREAST LIMITED: CPT

## 2025-03-17 ENCOUNTER — HOSPITAL ENCOUNTER (OUTPATIENT)
Dept: WOMENS IMAGING | Age: 30
Discharge: HOME | End: 2025-03-17
Payer: COMMERCIAL

## 2025-03-17 DIAGNOSIS — R92.8 ABNORMAL MAMMOGRAM: ICD-10-CM

## 2025-03-17 PROCEDURE — 76642 ULTRASOUND BREAST LIMITED: CPT

## 2025-07-05 ENCOUNTER — HOSPITAL ENCOUNTER (EMERGENCY)
Age: 30
Discharge: HOME | End: 2025-07-05
Attending: STUDENT IN AN ORGANIZED HEALTH CARE EDUCATION/TRAINING PROGRAM
Payer: COMMERCIAL

## 2025-07-05 ENCOUNTER — APPOINTMENT (OUTPATIENT)
Dept: CT IMAGING | Age: 30
End: 2025-07-05
Payer: COMMERCIAL

## 2025-07-05 ENCOUNTER — APPOINTMENT (OUTPATIENT)
Dept: GENERAL RADIOLOGY | Age: 30
End: 2025-07-05
Payer: COMMERCIAL

## 2025-07-05 VITALS
DIASTOLIC BLOOD PRESSURE: 78 MMHG | OXYGEN SATURATION: 98 % | RESPIRATION RATE: 18 BRPM | HEART RATE: 55 BPM | SYSTOLIC BLOOD PRESSURE: 121 MMHG | TEMPERATURE: 99.1 F

## 2025-07-05 DIAGNOSIS — M25.462 KNEE EFFUSION, LEFT: Primary | ICD-10-CM

## 2025-07-05 DIAGNOSIS — M25.562 ACUTE PAIN OF LEFT KNEE: ICD-10-CM

## 2025-07-05 DIAGNOSIS — S82.102A CLOSED FRACTURE OF PROXIMAL END OF LEFT TIBIA, UNSPECIFIED FRACTURE MORPHOLOGY, INITIAL ENCOUNTER: ICD-10-CM

## 2025-07-05 PROCEDURE — 6370000000 HC RX 637 (ALT 250 FOR IP): Performed by: STUDENT IN AN ORGANIZED HEALTH CARE EDUCATION/TRAINING PROGRAM

## 2025-07-05 PROCEDURE — 73700 CT LOWER EXTREMITY W/O DYE: CPT

## 2025-07-05 PROCEDURE — 99284 EMERGENCY DEPT VISIT MOD MDM: CPT

## 2025-07-05 PROCEDURE — 73562 X-RAY EXAM OF KNEE 3: CPT

## 2025-07-05 RX ORDER — OXYCODONE AND ACETAMINOPHEN 5; 325 MG/1; MG/1
1 TABLET ORAL ONCE
Refills: 0 | Status: COMPLETED | OUTPATIENT
Start: 2025-07-05 | End: 2025-07-05

## 2025-07-05 RX ORDER — ONDANSETRON 4 MG/1
4 TABLET, ORALLY DISINTEGRATING ORAL 3 TIMES DAILY PRN
Qty: 21 TABLET | Refills: 0 | Status: SHIPPED | OUTPATIENT
Start: 2025-07-05

## 2025-07-05 RX ORDER — ONDANSETRON 4 MG/1
4 TABLET, ORALLY DISINTEGRATING ORAL ONCE
Status: COMPLETED | OUTPATIENT
Start: 2025-07-05 | End: 2025-07-05

## 2025-07-05 RX ORDER — OXYCODONE AND ACETAMINOPHEN 5; 325 MG/1; MG/1
1 TABLET ORAL EVERY 6 HOURS PRN
Qty: 12 TABLET | Refills: 0 | Status: SHIPPED | OUTPATIENT
Start: 2025-07-05 | End: 2025-07-08

## 2025-07-05 RX ADMIN — OXYCODONE HYDROCHLORIDE AND ACETAMINOPHEN 1 TABLET: 5; 325 TABLET ORAL at 15:29

## 2025-07-05 RX ADMIN — ONDANSETRON 4 MG: 4 TABLET, ORALLY DISINTEGRATING ORAL at 11:50

## 2025-07-05 RX ADMIN — OXYCODONE HYDROCHLORIDE AND ACETAMINOPHEN 1 TABLET: 5; 325 TABLET ORAL at 11:49

## 2025-07-05 ASSESSMENT — PAIN DESCRIPTION - FREQUENCY: FREQUENCY: CONTINUOUS

## 2025-07-05 ASSESSMENT — PAIN SCALES - GENERAL
PAINLEVEL_OUTOF10: 5
PAINLEVEL_OUTOF10: 8
PAINLEVEL_OUTOF10: 5

## 2025-07-05 ASSESSMENT — PAIN DESCRIPTION - DESCRIPTORS
DESCRIPTORS: THROBBING
DESCRIPTORS: PATIENT UNABLE TO DESCRIBE;THROBBING

## 2025-07-05 ASSESSMENT — PAIN DESCRIPTION - PAIN TYPE: TYPE: ACUTE PAIN

## 2025-07-05 ASSESSMENT — PAIN DESCRIPTION - ONSET: ONSET: ON-GOING

## 2025-07-05 ASSESSMENT — PAIN DESCRIPTION - ORIENTATION
ORIENTATION: LEFT
ORIENTATION: LEFT

## 2025-07-05 ASSESSMENT — PAIN - FUNCTIONAL ASSESSMENT
PAIN_FUNCTIONAL_ASSESSMENT: PREVENTS OR INTERFERES SOME ACTIVE ACTIVITIES AND ADLS
PAIN_FUNCTIONAL_ASSESSMENT: 0-10

## 2025-07-05 ASSESSMENT — PAIN DESCRIPTION - LOCATION
LOCATION: KNEE
LOCATION: KNEE

## 2025-07-05 NOTE — ED PROVIDER NOTES
Emergency Department Provider Note  Location: [facility] EMERGENCY DEPARTMENT  2025     Patient Identification  Conchita Hurtado is a 30 y.o. female    Chief Complaint  Knee Pain (L knee pain, fell on it last night and hasn't out weight on it since)      Mode of Arrival  private car    HPI  (History provided by patient)  This is a 30 y.o. female without relevant past medical history presented today for left knee injury.  Patient reports that she fell on it last night and has not been able to put weight on it since.  Patient is endorsing pain localized to the left knee.  She is endorsing soft tissue swelling.  Reports that her knee feels weak and will give out.  She is endorsing swelling.  She has not taken anything for the pain.  Denies any head trauma    ROS  Review of Systems   Musculoskeletal:  Positive for arthralgias.   All other systems reviewed and are negative.        I have reviewed the following nursing documentation:  Allergies:   Allergies   Allergen Reactions    Ciprofloxacin Rash       Past medical history:  has a past medical history of Drug abuse (HCC), Fibromatosis, Hepatitis C, and Preeclampsia.    Past surgical history:  has a past surgical history that includes US BREAST BIOPSY W LOC DEVICE 1ST LESION RIGHT (Right, 2022);  section; Dilation and curettage of uterus (N/A, 7/15/2022); US BREAST BIOPSY W LOC DEVICE 1ST LESION RIGHT (Right, 5/3/2023); and  section (N/A, 2023).    Home medications:   Prior to Admission medications    Medication Sig Start Date End Date Taking? Authorizing Provider   oxyCODONE-acetaminophen (PERCOCET) 5-325 MG per tablet Take 1 tablet by mouth every 6 hours as needed for Pain for up to 3 days. Intended supply: 3 days. Take lowest dose possible to manage pain Max Daily Amount: 4 tablets 25 Yes Radha Johns MD   ondansetron (ZOFRAN-ODT) 4 MG disintegrating tablet Take 1 tablet by mouth 3 times daily as needed for  Nausea or Vomiting 7/5/25  Yes Radha Johns MD   docusate sodium (COLACE, DULCOLAX) 100 MG CAPS Take 100 mg by mouth 2 times daily 8/30/23   Tia Pugh MD   ferrous sulfate (IRON 325) 325 (65 Fe) MG tablet Take 1 tablet by mouth daily (with breakfast) 8/30/23   Tia Pugh MD   ibuprofen (ADVIL;MOTRIN) 800 MG tablet Take 1 tablet by mouth 3 times daily (with meals) 8/30/23   Tia Pugh MD   acetaminophen (TYLENOL) 500 MG tablet Take 2 tablets by mouth 4 times daily as needed for Pain 8/30/23   Tia Pugh MD   Prenatal Vit-Fe Fumarate-FA (PRENATAL 1+1 PO) Take 1 tablet by mouth daily 7/28/22   Provider, MD Henok       Social history:  reports that she quit smoking about 4 years ago. Her smoking use included cigarettes and e-cigarettes. She has never used smokeless tobacco. She reports current alcohol use. She reports current drug use. Frequency: 7.00 times per week. Drug: Marijuana (Weed).    Family history:    Family History   Problem Relation Age of Onset    Arthritis Mother     High Blood Pressure Mother     Arthritis Maternal Grandfather     Atrial Fibrillation Maternal Grandfather     Diabetes Paternal Grandfather     No Known Problems Father        Exam  ED Triage Vitals [07/05/25 1121]   BP Systolic BP Percentile Diastolic BP Percentile Temp Temp src Pulse Respirations SpO2   130/85 -- -- 99.1 °F (37.3 °C) -- 79 16 99 %      Height Weight         -- --             Physical Exam  Vitals and nursing note reviewed.   HENT:      Head: Normocephalic and atraumatic.      Right Ear: External ear normal.      Left Ear: External ear normal.      Nose: Nose normal.      Mouth/Throat:      Pharynx: Oropharynx is clear.   Eyes:      Conjunctiva/sclera: Conjunctivae normal.   Cardiovascular:      Rate and Rhythm: Normal rate and regular rhythm.      Pulses: Normal pulses.      Heart sounds: Normal heart sounds.   Pulmonary:      Effort: Pulmonary effort is normal.      Breath sounds: Normal

## 2025-07-05 NOTE — ED NOTES
Placed patient in a knee immbolizer and instructed her on it. Patient understood instructions and to leave it on at all times. Placed patient in crutches and measured them to her. She said she knows how to use them when we talked about showing me she can use them.

## 2025-07-08 ENCOUNTER — OFFICE VISIT (OUTPATIENT)
Dept: ORTHOPEDIC SURGERY | Age: 30
End: 2025-07-08
Payer: COMMERCIAL

## 2025-07-08 VITALS — BODY MASS INDEX: 30.78 KG/M2 | HEIGHT: 61 IN | WEIGHT: 163 LBS

## 2025-07-08 DIAGNOSIS — S82.142A CLOSED FRACTURE OF LEFT TIBIAL PLATEAU, INITIAL ENCOUNTER: ICD-10-CM

## 2025-07-08 DIAGNOSIS — M25.562 ACUTE PAIN OF LEFT KNEE: Primary | ICD-10-CM

## 2025-07-08 PROCEDURE — 1036F TOBACCO NON-USER: CPT | Performed by: PHYSICIAN ASSISTANT

## 2025-07-08 PROCEDURE — G8427 DOCREV CUR MEDS BY ELIG CLIN: HCPCS | Performed by: PHYSICIAN ASSISTANT

## 2025-07-08 PROCEDURE — G8417 CALC BMI ABV UP PARAM F/U: HCPCS | Performed by: PHYSICIAN ASSISTANT

## 2025-07-08 PROCEDURE — L1833 KO ADJ JNT POS R SUP PRE OTS: HCPCS | Performed by: PHYSICIAN ASSISTANT

## 2025-07-08 PROCEDURE — 27530 TREAT KNEE FRACTURE: CPT | Performed by: PHYSICIAN ASSISTANT

## 2025-07-08 PROCEDURE — 99203 OFFICE O/P NEW LOW 30 MIN: CPT | Performed by: PHYSICIAN ASSISTANT

## 2025-07-08 NOTE — PROGRESS NOTES
use: Yes     Comment: holidays      Current Outpatient Medications on File Prior to Visit   Medication Sig Dispense Refill    oxyCODONE-acetaminophen (PERCOCET) 5-325 MG per tablet Take 1 tablet by mouth every 6 hours as needed for Pain for up to 3 days. Intended supply: 3 days. Take lowest dose possible to manage pain Max Daily Amount: 4 tablets 12 tablet 0    ondansetron (ZOFRAN-ODT) 4 MG disintegrating tablet Take 1 tablet by mouth 3 times daily as needed for Nausea or Vomiting 21 tablet 0    docusate sodium (COLACE, DULCOLAX) 100 MG CAPS Take 100 mg by mouth 2 times daily 60 capsule 0    ferrous sulfate (IRON 325) 325 (65 Fe) MG tablet Take 1 tablet by mouth daily (with breakfast) 30 tablet 0    ibuprofen (ADVIL;MOTRIN) 800 MG tablet Take 1 tablet by mouth 3 times daily (with meals) 30 tablet 0    acetaminophen (TYLENOL) 500 MG tablet Take 2 tablets by mouth 4 times daily as needed for Pain 80 tablet 0    Prenatal Vit-Fe Fumarate-FA (PRENATAL 1+1 PO) Take 1 tablet by mouth daily       No current facility-administered medications on file prior to visit.      ASCVD 10-YEAR RISK SCORE  The ASCVD Risk score (Gurdeep DK, et al., 2019) failed to calculate for the following reasons:    The 2019 ASCVD risk score is only valid for ages 40 to 79     Review of Systems  10-point ROS is negative other than HPI.    Physical Exam  Based off 1997 Exam Criteria  Ht 1.549 m (5' 1\")   Wt 73.9 kg (163 lb)   BMI 30.80 kg/m²      Constitutional:       General: He is not in acute distress.     Appearance: Normal appearance.   Cardiovascular:      Rate and Rhythm: Normal rate and regular rhythm.      Pulses: Normal pulses.   Pulmonary:      Effort: Pulmonary effort is normal. No respiratory distress.   Neurological:      Mental Status: He is alert and oriented to person, place, and time. Mental status is at baseline.   Skin: Mean, dry, intact.  No open sores  Lymphatics: No palpable lymph nodes    Musculoskeletal:  Gait:

## 2025-08-05 ENCOUNTER — OFFICE VISIT (OUTPATIENT)
Dept: ORTHOPEDIC SURGERY | Age: 30
End: 2025-08-05

## 2025-08-05 VITALS — HEIGHT: 61 IN | BODY MASS INDEX: 30.78 KG/M2 | WEIGHT: 163 LBS

## 2025-08-05 DIAGNOSIS — S82.142A CLOSED FRACTURE OF LEFT TIBIAL PLATEAU, INITIAL ENCOUNTER: Primary | ICD-10-CM

## 2025-08-05 PROCEDURE — 99024 POSTOP FOLLOW-UP VISIT: CPT | Performed by: ORTHOPAEDIC SURGERY

## (undated) DEVICE — PAD,NON-ADHERENT,3X8,STERILE,LF,1/PK: Brand: MEDLINE

## (undated) DEVICE — DRESSING COMP IS W4XL10IN PD W2XL8IN CNTCT LAYR ADH

## (undated) DEVICE — 3M™ STERI-STRIP™ COMPOUND BENZOIN TINCTURE 40 BAGS/CARTON 4 CARTONS/CASE C1544: Brand: 3M™ STERI-STRIP™

## (undated) DEVICE — SUTURE MCRYL SZ 3-0 L27IN ABSRB UD L60MM KS STR REV CUT Y523H

## (undated) DEVICE — SOLUTION IV IRRIG POUR BRL 0.9% SODIUM CHL 2F7124

## (undated) DEVICE — S/USE RESUS KIT W/O MASK (10): Brand: FISHER & PAYKEL HEALTHCARE

## (undated) DEVICE — GLOVE SURG SZ 55 THK91MIL ORANGE  LTX FREE SYN POLYISOPRENE

## (undated) DEVICE — SUTURE VCRL SZ 3-0 L36IN ABSRB UD L36MM CT-1 1/2 CIR J944H

## (undated) DEVICE — GARMENT COMPR L FOR 23IN CALF FLOTRN

## (undated) DEVICE — Device

## (undated) DEVICE — Z INACTIVE NO ACTIVE SUPPLIER APPLICATOR MEDICATED 26 CC TINT HI-LITE ORNG STRL CHLORAPREP

## (undated) DEVICE — GLOVE ORTHO 8   MSG9480

## (undated) DEVICE — SUTURE VCRL SZ 0 L36IN ABSRB UD L36MM CT-1 1/2 CIR J946H

## (undated) DEVICE — INVIEW CLEAR LEGGINGS: Brand: CONVERTORS

## (undated) DEVICE — BLADE CLIPPER GEN PURP NS

## (undated) DEVICE — TRAY URIN CATH 16FR DRNGE BG STATLOK STBL DEV F SURSTP